# Patient Record
Sex: FEMALE | Race: WHITE | Employment: UNEMPLOYED | URBAN - NONMETROPOLITAN AREA
[De-identification: names, ages, dates, MRNs, and addresses within clinical notes are randomized per-mention and may not be internally consistent; named-entity substitution may affect disease eponyms.]

---

## 2018-01-30 ENCOUNTER — OFFICE VISIT (OUTPATIENT)
Dept: FAMILY MEDICINE CLINIC | Age: 58
End: 2018-01-30
Payer: COMMERCIAL

## 2018-01-30 VITALS
DIASTOLIC BLOOD PRESSURE: 84 MMHG | RESPIRATION RATE: 12 BRPM | BODY MASS INDEX: 48.82 KG/M2 | HEART RATE: 84 BPM | HEIGHT: 65 IN | SYSTOLIC BLOOD PRESSURE: 116 MMHG | WEIGHT: 293 LBS

## 2018-01-30 DIAGNOSIS — G90.01 CAROTIDYNIA: Primary | ICD-10-CM

## 2018-01-30 DIAGNOSIS — M31.30 WEGENER'S GRANULOMATOSIS: ICD-10-CM

## 2018-01-30 DIAGNOSIS — E66.01 MORBID OBESITY WITH BMI OF 45.0-49.9, ADULT (HCC): ICD-10-CM

## 2018-01-30 PROCEDURE — 99214 OFFICE O/P EST MOD 30 MIN: CPT | Performed by: FAMILY MEDICINE

## 2018-01-30 RX ORDER — ROSUVASTATIN CALCIUM 20 MG/1
20 TABLET, COATED ORAL DAILY
COMMUNITY
End: 2019-10-11

## 2018-01-30 RX ORDER — FOLIC ACID 1 MG/1
1 TABLET ORAL DAILY
COMMUNITY
Start: 2018-01-18 | End: 2018-07-05 | Stop reason: ALTCHOICE

## 2018-01-30 RX ORDER — PREDNISONE 1 MG/1
1 TABLET ORAL DAILY
COMMUNITY
Start: 2018-01-18 | End: 2019-10-05

## 2018-01-30 ASSESSMENT — PATIENT HEALTH QUESTIONNAIRE - PHQ9
SUM OF ALL RESPONSES TO PHQ9 QUESTIONS 1 & 2: 0
SUM OF ALL RESPONSES TO PHQ QUESTIONS 1-9: 0
2. FEELING DOWN, DEPRESSED OR HOPELESS: 0
1. LITTLE INTEREST OR PLEASURE IN DOING THINGS: 0

## 2018-01-30 ASSESSMENT — ENCOUNTER SYMPTOMS
SORE THROAT: 0
NAUSEA: 0
BLOOD IN STOOL: 0
VOMITING: 0
EYE PAIN: 0
DIARRHEA: 0
ABDOMINAL PAIN: 0
COUGH: 0
CHEST TIGHTNESS: 0
CONSTIPATION: 0
WHEEZING: 0
BACK PAIN: 0
SHORTNESS OF BREATH: 0
RHINORRHEA: 0

## 2018-01-30 NOTE — PATIENT INSTRUCTIONS
Patient Education        Carotidynia: Care Instructions  Your Care Instructions  Carotidynia is a pain that you feel in your neck or face. It is linked with physical changes that can happen in a carotid artery in your neck. Your neck may feel tender in the area of the artery. The pain often goes up the neck to the jaw, ear, or forehead. Carotidynia usually occurs in young or middle-aged adults. Some diseases can cause carotidynia. Your doctor will check for those. Simple carotidynia may be treated with pain medicine. Follow-up care is a key part of your treatment and safety. Be sure to make and go to all appointments, and call your doctor if you are having problems. It's also a good idea to know your test results and keep a list of the medicines you take. How can you care for yourself at home? · Use a cold pack where your neck hurts. This may help decrease pain and swelling. If the problem is near the shoulder or upper back, ice the back of the neck. Do this for 10 to 20 minutes at a time. Put a thin cloth between the ice and your skin. You may also use ice frozen in a foam cup. Be sure not to leave it on your skin too long, or you can get frostbite. · Try heat if ice does not help. To apply heat, put a warm water bottle, a heating pad set on low, or a warm cloth on your neck. Do not go to sleep with a heating pad on your skin. · Take anti-inflammatory medicine to reduce pain and swelling. These include ibuprofen (Advil, Motrin) and naproxen (Aleve). Read and follow all instructions on the label. When should you call for help? Call 911 anytime you think you may need emergency care. For example, call if:  ? · You have severe pain in your neck, ear, or head and have numbness on one side of your body. ?Call your doctor now or seek immediate medical care if:  ? · Your home treatment does not work. ? · Your pain gets worse or keeps you from doing what you normally do.    ? · You get numbness or weakness anywhere. ? · You have a fever. ? Watch closely for changes in your health, and be sure to contact your doctor if you have any problems. Where can you learn more? Go to https://Bubblypeblake.[employer]. org and sign in to your [employer] account. Enter O548 in the [facility] box to learn more about \"Carotidynia: Care Instructions. \"     If you do not have an account, please click on the \"Sign Up Now\" link. Current as of: March 20, 2017  Content Version: 11.5  © 0237-3919 [employer]. Care instructions adapted under license by [facility] ([facility]). If you have questions about a medical condition or this instruction, always ask your healthcare professional. [address] any warranty or liability for your use of this information. Patient Education        DASH Diet: Care Instructions  Your Care Instructions    The DASH diet is an eating plan that can help lower your blood pressure. DASH stands for Dietary Approaches to Stop Hypertension. Hypertension is high blood pressure. The DASH diet focuses on eating foods that are high in calcium, potassium, and magnesium. These nutrients can lower blood pressure. The foods that are highest in these nutrients are fruits, vegetables, low-fat dairy products, nuts, seeds, and legumes. But taking calcium, potassium, and magnesium supplements instead of eating foods that are high in those nutrients does not have the same effect. The DASH diet also includes whole grains, fish, and poultry. The DASH diet is one of several lifestyle changes your doctor may recommend to lower your high blood pressure. Your doctor may also want you to decrease the amount of sodium in your diet. Lowering sodium while following the DASH diet can lower blood pressure even further than just the DASH diet alone. Follow-up care is a key part of your treatment and safety.  Be sure to make and go to all appointments, and call your doctor if you are having

## 2018-01-30 NOTE — PROGRESS NOTES
Subjective:      Patient ID: Edson Maxwell is a 65 y.o. female. HPI  Pt here for a check up. REviewed BMI of 49. Encouraged diet, exercise and weight loss. Complains of right sided carotid pain last night. It is still there today but much improved. If on autoimmune meds for Wegeners granulomatosis with Dr. Chema Reno. , non smoker, pmh reviewed. No carotid imaging has been done. Review of Systems   Constitutional: Negative for chills, fatigue, fever and unexpected weight change. HENT: Negative for congestion, ear pain, rhinorrhea and sore throat. Eyes: Negative for pain and visual disturbance. Respiratory: Negative for cough, chest tightness, shortness of breath and wheezing. Cardiovascular: Negative for chest pain and palpitations. Gastrointestinal: Negative for abdominal pain, blood in stool, constipation, diarrhea, nausea and vomiting. Genitourinary: Negative for difficulty urinating, frequency, hematuria and urgency. Musculoskeletal: Positive for neck pain. Negative for back pain, joint swelling and myalgias. Skin: Negative for rash. Neurological: Negative for dizziness and headaches. Hematological: Negative for adenopathy. Does not bruise/bleed easily. Psychiatric/Behavioral: Negative for behavioral problems and sleep disturbance. The patient is not nervous/anxious. Objective:   Physical Exam   Constitutional: She is oriented to person, place, and time. She appears well-developed and well-nourished. HENT:   Head: Normocephalic and atraumatic. Right Ear: External ear normal.   Left Ear: External ear normal.   Nose: Nose normal.   Mouth/Throat: Oropharynx is clear and moist.   Eyes: EOM are normal. Pupils are equal, round, and reactive to light. Neck: Neck supple. Carotid bruit is not present. No thyromegaly present. Cardiovascular: Normal rate, regular rhythm and normal heart sounds. Pulmonary/Chest: Breath sounds normal. She has no wheezes.  She has no rales. Abdominal: Soft. Bowel sounds are normal. There is no tenderness. There is no rebound and no guarding. Musculoskeletal: Normal range of motion. She exhibits no edema. Lymphadenopathy:     She has no cervical adenopathy. Neurological: She is alert and oriented to person, place, and time. She has normal reflexes. No cranial nerve deficit. Skin: Skin is warm and dry. No rash noted. Psychiatric: She has a normal mood and affect. Nursing note and vitals reviewed. Assessment:      Carotidynia  Wegeners granulomatosis  Morbid obesity      Plan:      Carotid ultrasound--BRYANT German--prefers AMs  Continue the prednisone and methotrexate  Encouraged diet, exercise and weight loss. Dash diet    Julianne Ibarra received counseling on the following healthy behaviors: nutrition, exercise and medication adherence    Patient given educational materials on Nutrition and Exercise    I have instructed Julinane Ibarra to complete a self tracking handout on Weights and instructed them to bring it with them to her next appointment. Discussed use, benefit, and side effects of prescribed medications. Barriers to medication compliance addressed. All patient questions answered. Pt voiced understanding.

## 2018-02-02 ENCOUNTER — TELEPHONE (OUTPATIENT)
Dept: FAMILY MEDICINE CLINIC | Age: 58
End: 2018-02-02

## 2018-04-20 ENCOUNTER — OFFICE VISIT (OUTPATIENT)
Dept: FAMILY MEDICINE CLINIC | Age: 58
End: 2018-04-20
Payer: COMMERCIAL

## 2018-04-20 VITALS
HEART RATE: 48 BPM | SYSTOLIC BLOOD PRESSURE: 116 MMHG | WEIGHT: 291.4 LBS | DIASTOLIC BLOOD PRESSURE: 80 MMHG | RESPIRATION RATE: 12 BRPM | BODY MASS INDEX: 48.12 KG/M2

## 2018-04-20 DIAGNOSIS — R00.2 HEART PALPITATIONS: ICD-10-CM

## 2018-04-20 DIAGNOSIS — E66.01 MORBID OBESITY WITH BMI OF 45.0-49.9, ADULT (HCC): ICD-10-CM

## 2018-04-20 DIAGNOSIS — R00.0 TACHYCARDIA: Primary | ICD-10-CM

## 2018-04-20 DIAGNOSIS — M31.30 WEGENER'S GRANULOMATOSIS: ICD-10-CM

## 2018-04-20 PROCEDURE — 99214 OFFICE O/P EST MOD 30 MIN: CPT | Performed by: FAMILY MEDICINE

## 2018-04-20 ASSESSMENT — ENCOUNTER SYMPTOMS
WHEEZING: 0
BLOOD IN STOOL: 0
CHEST TIGHTNESS: 0
BACK PAIN: 0
DIARRHEA: 0
SHORTNESS OF BREATH: 1
NAUSEA: 0
SORE THROAT: 0
CONSTIPATION: 0
COUGH: 0
RHINORRHEA: 0
ABDOMINAL PAIN: 0
VOMITING: 0
EYE PAIN: 0

## 2018-07-05 ENCOUNTER — OFFICE VISIT (OUTPATIENT)
Dept: FAMILY MEDICINE CLINIC | Age: 58
End: 2018-07-05
Payer: COMMERCIAL

## 2018-07-05 VITALS
WEIGHT: 282.4 LBS | DIASTOLIC BLOOD PRESSURE: 82 MMHG | SYSTOLIC BLOOD PRESSURE: 110 MMHG | BODY MASS INDEX: 46.63 KG/M2 | RESPIRATION RATE: 16 BRPM | TEMPERATURE: 97.5 F | HEART RATE: 100 BPM

## 2018-07-05 DIAGNOSIS — B97.89 VIRAL STOMATITIS: Primary | ICD-10-CM

## 2018-07-05 DIAGNOSIS — K12.1 VIRAL STOMATITIS: Primary | ICD-10-CM

## 2018-07-05 PROCEDURE — 99213 OFFICE O/P EST LOW 20 MIN: CPT | Performed by: FAMILY MEDICINE

## 2018-07-05 RX ORDER — AMOXICILLIN 875 MG/1
TABLET, COATED ORAL
COMMUNITY
Start: 2018-07-01 | End: 2019-02-06 | Stop reason: ALTCHOICE

## 2018-07-05 RX ORDER — HYDROCHLOROTHIAZIDE 12.5 MG/1
TABLET ORAL
COMMUNITY
Start: 2018-05-24 | End: 2019-10-11

## 2018-07-05 ASSESSMENT — ENCOUNTER SYMPTOMS
DIARRHEA: 1
SORE THROAT: 1
ABDOMINAL PAIN: 1
COUGH: 0
SHORTNESS OF BREATH: 0

## 2018-07-05 NOTE — PROGRESS NOTES
Chief Complaint   Patient presents with    Mouth Lesions     otalgia, diarrhea, chills, sweats x2 weeks         Mandy Thompson is a 63 y. o.female      Pt complains of right earache, sores on right tongue and right sore throat over the last 2 weeks. Was seen in an urgent care and started on amoxil. Now has diarrhea and abdominal cramping. No fever. Some sweats when she has abdominal cramping. Patient of Dr. Theodora Oakes. Nonsmoker. Review of Systems   Constitutional: Positive for appetite change, chills and diaphoresis. HENT: Positive for ear pain, mouth sores and sore throat. Respiratory: Negative for cough and shortness of breath. Cardiovascular: Negative. Gastrointestinal: Positive for abdominal pain and diarrhea. Skin: Negative for rash. Neurological: Negative for dizziness and headaches. All other systems reviewed and are negative. OBJECTIVE     /82 (Site: Right Arm, Position: Sitting, Cuff Size: Large Adult)   Pulse 100   Temp 97.5 °F (36.4 °C) (Oral)   Resp 16   Wt 282 lb 6.4 oz (128.1 kg)   BMI 46.63 kg/m²     Physical Exam   Constitutional: She appears well-developed and well-nourished. HENT:   Right Ear: Tympanic membrane normal.   Left Ear: Tympanic membrane normal.   Mouth/Throat: Oropharynx is clear and moist.       Cardiovascular: Normal rate and regular rhythm. No murmur heard. Pulmonary/Chest: Breath sounds normal. She has no wheezes. Musculoskeletal: She exhibits no edema. Lymphadenopathy:     She has no cervical adenopathy. ASSESSMENT      1. Viral stomatitis        PLAN     Magic Mouthwash with equal parts viscous lidocaine, benadryl and maalox. 5ml swish and spit qid. Ok to stop antibiotic. Illness likely viral.    Ok for probiotic to help with gi symptoms.     Follow up if not better            Electronically signed by Henry Winter MD on 7/5/2018 at 11:05 AM

## 2019-02-06 ENCOUNTER — OFFICE VISIT (OUTPATIENT)
Dept: FAMILY MEDICINE CLINIC | Age: 59
End: 2019-02-06
Payer: COMMERCIAL

## 2019-02-06 VITALS
HEIGHT: 65 IN | SYSTOLIC BLOOD PRESSURE: 110 MMHG | WEIGHT: 293 LBS | RESPIRATION RATE: 12 BRPM | HEART RATE: 78 BPM | TEMPERATURE: 98.1 F | BODY MASS INDEX: 48.82 KG/M2 | DIASTOLIC BLOOD PRESSURE: 82 MMHG

## 2019-02-06 DIAGNOSIS — J01.40 ACUTE NON-RECURRENT PANSINUSITIS: Primary | ICD-10-CM

## 2019-02-06 PROCEDURE — 99213 OFFICE O/P EST LOW 20 MIN: CPT | Performed by: FAMILY MEDICINE

## 2019-02-06 RX ORDER — AMOXICILLIN AND CLAVULANATE POTASSIUM 875; 125 MG/1; MG/1
1 TABLET, FILM COATED ORAL 2 TIMES DAILY
Qty: 20 TABLET | Refills: 0 | Status: SHIPPED | OUTPATIENT
Start: 2019-02-06 | End: 2019-05-17 | Stop reason: SDUPTHER

## 2019-02-06 ASSESSMENT — PATIENT HEALTH QUESTIONNAIRE - PHQ9
2. FEELING DOWN, DEPRESSED OR HOPELESS: 0
SUM OF ALL RESPONSES TO PHQ QUESTIONS 1-9: 0
SUM OF ALL RESPONSES TO PHQ QUESTIONS 1-9: 0
SUM OF ALL RESPONSES TO PHQ9 QUESTIONS 1 & 2: 0
1. LITTLE INTEREST OR PLEASURE IN DOING THINGS: 0

## 2019-02-06 ASSESSMENT — ENCOUNTER SYMPTOMS
RHINORRHEA: 0
ABDOMINAL PAIN: 0
DIARRHEA: 0
SINUS PRESSURE: 1
BLOOD IN STOOL: 0
COUGH: 0
NAUSEA: 0
SHORTNESS OF BREATH: 1
BACK PAIN: 0
EYE PAIN: 0
SORE THROAT: 0
VOMITING: 0
WHEEZING: 0
SWOLLEN GLANDS: 1
CHEST TIGHTNESS: 0
CONSTIPATION: 0
HOARSE VOICE: 1

## 2019-05-17 ENCOUNTER — TELEPHONE (OUTPATIENT)
Dept: FAMILY MEDICINE CLINIC | Age: 59
End: 2019-05-17

## 2019-05-17 DIAGNOSIS — J01.40 ACUTE NON-RECURRENT PANSINUSITIS: ICD-10-CM

## 2019-05-17 RX ORDER — AMOXICILLIN AND CLAVULANATE POTASSIUM 875; 125 MG/1; MG/1
1 TABLET, FILM COATED ORAL 2 TIMES DAILY
Qty: 20 TABLET | Refills: 0 | Status: SHIPPED | OUTPATIENT
Start: 2019-05-17 | End: 2019-05-27

## 2019-08-19 ENCOUNTER — OFFICE VISIT (OUTPATIENT)
Dept: FAMILY MEDICINE CLINIC | Age: 59
End: 2019-08-19
Payer: COMMERCIAL

## 2019-08-19 VITALS
DIASTOLIC BLOOD PRESSURE: 60 MMHG | HEART RATE: 64 BPM | WEIGHT: 291 LBS | TEMPERATURE: 97.6 F | BODY MASS INDEX: 48.05 KG/M2 | RESPIRATION RATE: 14 BRPM | SYSTOLIC BLOOD PRESSURE: 118 MMHG

## 2019-08-19 DIAGNOSIS — J01.01 RECURRENT MAXILLARY SINUSITIS: Primary | ICD-10-CM

## 2019-08-19 PROCEDURE — 99213 OFFICE O/P EST LOW 20 MIN: CPT | Performed by: NURSE PRACTITIONER

## 2019-08-19 RX ORDER — AMOXICILLIN AND CLAVULANATE POTASSIUM 875; 125 MG/1; MG/1
1 TABLET, FILM COATED ORAL 2 TIMES DAILY
Qty: 20 TABLET | Refills: 0 | Status: SHIPPED | OUTPATIENT
Start: 2019-08-19 | End: 2019-08-26 | Stop reason: SDUPTHER

## 2019-08-19 RX ORDER — CEPHALEXIN 500 MG/1
CAPSULE ORAL
Refills: 0 | COMMUNITY
Start: 2019-08-11 | End: 2019-08-26

## 2019-08-19 ASSESSMENT — ENCOUNTER SYMPTOMS
COUGH: 0
EYE ITCHING: 0
EYE REDNESS: 1
CONSTIPATION: 0
RHINORRHEA: 1
SINUS PRESSURE: 1
EYE DISCHARGE: 0
COLOR CHANGE: 0
ABDOMINAL PAIN: 0
VOICE CHANGE: 1
BACK PAIN: 0
SORE THROAT: 0
WHEEZING: 0
DIARRHEA: 0
EYE PAIN: 0
SHORTNESS OF BREATH: 0

## 2019-08-26 ENCOUNTER — OFFICE VISIT (OUTPATIENT)
Dept: FAMILY MEDICINE CLINIC | Age: 59
End: 2019-08-26
Payer: COMMERCIAL

## 2019-08-26 VITALS
DIASTOLIC BLOOD PRESSURE: 82 MMHG | HEART RATE: 72 BPM | RESPIRATION RATE: 14 BRPM | SYSTOLIC BLOOD PRESSURE: 138 MMHG | TEMPERATURE: 98.1 F | BODY MASS INDEX: 49.28 KG/M2 | WEIGHT: 293 LBS

## 2019-08-26 DIAGNOSIS — J01.01 RECURRENT MAXILLARY SINUSITIS: ICD-10-CM

## 2019-08-26 DIAGNOSIS — J32.9 RECURRENT SINUSITIS: Primary | ICD-10-CM

## 2019-08-26 PROCEDURE — 99213 OFFICE O/P EST LOW 20 MIN: CPT | Performed by: FAMILY MEDICINE

## 2019-08-26 RX ORDER — AMOXICILLIN AND CLAVULANATE POTASSIUM 875; 125 MG/1; MG/1
1 TABLET, FILM COATED ORAL 2 TIMES DAILY
Qty: 20 TABLET | Refills: 0 | Status: SHIPPED | OUTPATIENT
Start: 2019-08-26 | End: 2020-03-25 | Stop reason: SDUPTHER

## 2019-08-26 ASSESSMENT — ENCOUNTER SYMPTOMS
SHORTNESS OF BREATH: 0
NAUSEA: 0
SORE THROAT: 0
BLOOD IN STOOL: 0
ABDOMINAL PAIN: 0
COUGH: 0
RHINORRHEA: 1
DIARRHEA: 0
CONSTIPATION: 0
WHEEZING: 0
CHEST TIGHTNESS: 0
SINUS PAIN: 1
VOMITING: 0
BACK PAIN: 0
EYE PAIN: 0
SINUS PRESSURE: 1

## 2019-08-26 NOTE — PATIENT INSTRUCTIONS

## 2019-09-03 ENCOUNTER — OFFICE VISIT (OUTPATIENT)
Dept: ENT CLINIC | Age: 59
End: 2019-09-03
Payer: COMMERCIAL

## 2019-09-03 VITALS
DIASTOLIC BLOOD PRESSURE: 72 MMHG | HEIGHT: 65 IN | BODY MASS INDEX: 48.82 KG/M2 | SYSTOLIC BLOOD PRESSURE: 126 MMHG | WEIGHT: 293 LBS | HEART RATE: 84 BPM | TEMPERATURE: 97.7 F | RESPIRATION RATE: 16 BRPM

## 2019-09-03 DIAGNOSIS — J32.2 CHRONIC ETHMOIDAL SINUSITIS: ICD-10-CM

## 2019-09-03 DIAGNOSIS — J32.0 CHRONIC MAXILLARY SINUSITIS: Primary | ICD-10-CM

## 2019-09-03 PROCEDURE — 99203 OFFICE O/P NEW LOW 30 MIN: CPT | Performed by: PHYSICIAN ASSISTANT

## 2019-09-03 RX ORDER — FLUTICASONE PROPIONATE 50 MCG
2 SPRAY, SUSPENSION (ML) NASAL DAILY
Qty: 1 BOTTLE | Refills: 3 | Status: SHIPPED | OUTPATIENT
Start: 2019-09-03 | End: 2020-03-04

## 2019-09-03 RX ORDER — AMOXICILLIN AND CLAVULANATE POTASSIUM 875; 125 MG/1; MG/1
1 TABLET, FILM COATED ORAL 2 TIMES DAILY
Qty: 42 TABLET | Refills: 0 | Status: SHIPPED | OUTPATIENT
Start: 2019-09-03 | End: 2019-09-24

## 2019-09-03 ASSESSMENT — ENCOUNTER SYMPTOMS
TROUBLE SWALLOWING: 0
VOMITING: 0
COLOR CHANGE: 0
VOICE CHANGE: 0
STRIDOR: 0
ABDOMINAL PAIN: 0
CHOKING: 0
SORE THROAT: 0
NAUSEA: 0
FACIAL SWELLING: 1
SHORTNESS OF BREATH: 0
SINUS PRESSURE: 1
CHEST TIGHTNESS: 0
APNEA: 0
DIARRHEA: 0
COUGH: 0
RHINORRHEA: 1
WHEEZING: 0

## 2019-09-05 LAB — THROAT/NOSE CULTURE: NORMAL

## 2019-09-17 ENCOUNTER — TELEPHONE (OUTPATIENT)
Dept: ENT CLINIC | Age: 59
End: 2019-09-17

## 2019-09-18 RX ORDER — DOXYCYCLINE HYCLATE 100 MG
100 TABLET ORAL 2 TIMES DAILY
Qty: 20 TABLET | Refills: 0 | Status: SHIPPED | OUTPATIENT
Start: 2019-09-18 | End: 2019-09-28

## 2019-09-18 NOTE — TELEPHONE ENCOUNTER
Spoke to pt today and she is feeling worse today, she is tired of feeling so fatigued that she is unable to work & drive

## 2019-09-30 ENCOUNTER — TELEPHONE (OUTPATIENT)
Dept: ENT CLINIC | Age: 59
End: 2019-09-30

## 2019-10-03 ENCOUNTER — OFFICE VISIT (OUTPATIENT)
Dept: FAMILY MEDICINE CLINIC | Age: 59
End: 2019-10-03
Payer: COMMERCIAL

## 2019-10-03 VITALS
SYSTOLIC BLOOD PRESSURE: 112 MMHG | BODY MASS INDEX: 48.96 KG/M2 | RESPIRATION RATE: 14 BRPM | TEMPERATURE: 98.1 F | WEIGHT: 293 LBS | HEART RATE: 72 BPM | DIASTOLIC BLOOD PRESSURE: 68 MMHG

## 2019-10-03 DIAGNOSIS — J32.9 RECURRENT SINUSITIS: Primary | ICD-10-CM

## 2019-10-03 PROCEDURE — 99213 OFFICE O/P EST LOW 20 MIN: CPT | Performed by: FAMILY MEDICINE

## 2019-10-03 RX ORDER — PREDNISONE 20 MG/1
TABLET ORAL
Qty: 15 TABLET | Refills: 0 | Status: SHIPPED | OUTPATIENT
Start: 2019-10-03 | End: 2019-10-09 | Stop reason: SINTOL

## 2019-10-03 RX ORDER — CLINDAMYCIN HYDROCHLORIDE 300 MG/1
300 CAPSULE ORAL 4 TIMES DAILY
Qty: 40 CAPSULE | Refills: 0 | Status: SHIPPED | OUTPATIENT
Start: 2019-10-03 | End: 2019-10-11

## 2019-10-03 ASSESSMENT — ENCOUNTER SYMPTOMS
DIARRHEA: 0
HOARSE VOICE: 0
EYE PAIN: 0
SINUS PAIN: 1
SORE THROAT: 1
BACK PAIN: 0
BLOOD IN STOOL: 0
VOMITING: 0
SHORTNESS OF BREATH: 0
ABDOMINAL PAIN: 0
COUGH: 1
SINUS PRESSURE: 1
CHEST TIGHTNESS: 0
SWOLLEN GLANDS: 1
CONSTIPATION: 0
NAUSEA: 0
RHINORRHEA: 0
WHEEZING: 0

## 2019-10-04 ENCOUNTER — HOSPITAL ENCOUNTER (OUTPATIENT)
Dept: CT IMAGING | Age: 59
Discharge: HOME | End: 2019-10-04
Payer: COMMERCIAL

## 2019-10-04 DIAGNOSIS — J32.2 CHRONIC ETHMOIDAL SINUSITIS: ICD-10-CM

## 2019-10-04 DIAGNOSIS — J32.0 CHRONIC MAXILLARY SINUSITIS: ICD-10-CM

## 2019-10-04 PROCEDURE — 70486 CT MAXILLOFACIAL W/O DYE: CPT

## 2019-10-05 ENCOUNTER — HOSPITAL ENCOUNTER (EMERGENCY)
Age: 59
Discharge: HOME | End: 2019-10-05
Attending: EMERGENCY MEDICINE
Payer: COMMERCIAL

## 2019-10-05 VITALS
RESPIRATION RATE: 18 BRPM | OXYGEN SATURATION: 95 % | DIASTOLIC BLOOD PRESSURE: 99 MMHG | TEMPERATURE: 97.6 F | HEIGHT: 66 IN | WEIGHT: 290 LBS | BODY MASS INDEX: 46.61 KG/M2 | HEART RATE: 96 BPM | SYSTOLIC BLOOD PRESSURE: 164 MMHG

## 2019-10-05 DIAGNOSIS — M31.30: Primary | ICD-10-CM

## 2019-10-05 DIAGNOSIS — R03.0 ELEVATED BLOOD PRESSURE READING: ICD-10-CM

## 2019-10-05 PROCEDURE — 99283 EMERGENCY DEPT VISIT LOW MDM: CPT

## 2019-10-05 ASSESSMENT — ENCOUNTER SYMPTOMS
ABDOMINAL DISTENTION: 0
SHORTNESS OF BREATH: 0
VOMITING: 0
ABDOMINAL PAIN: 0
DIARRHEA: 0
RHINORRHEA: 1
NAUSEA: 0
SINUS PAIN: 1
COUGH: 0
EYE ITCHING: 0
EYE DISCHARGE: 0
WHEEZING: 0

## 2019-10-05 ASSESSMENT — PAIN DESCRIPTION - PAIN TYPE: TYPE: ACUTE PAIN

## 2019-10-05 ASSESSMENT — PAIN DESCRIPTION - LOCATION: LOCATION: NOSE

## 2019-10-05 ASSESSMENT — PAIN SCALES - GENERAL: PAINLEVEL_OUTOF10: 4

## 2019-10-05 ASSESSMENT — PAIN DESCRIPTION - DESCRIPTORS: DESCRIPTORS: CONSTANT

## 2019-10-07 ENCOUNTER — TELEPHONE (OUTPATIENT)
Dept: FAMILY MEDICINE CLINIC | Age: 59
End: 2019-10-07

## 2019-10-07 ENCOUNTER — OFFICE VISIT (OUTPATIENT)
Dept: FAMILY MEDICINE CLINIC | Age: 59
End: 2019-10-07
Payer: COMMERCIAL

## 2019-10-07 VITALS
RESPIRATION RATE: 12 BRPM | DIASTOLIC BLOOD PRESSURE: 84 MMHG | SYSTOLIC BLOOD PRESSURE: 120 MMHG | BODY MASS INDEX: 46.58 KG/M2 | HEART RATE: 84 BPM | WEIGHT: 288.6 LBS

## 2019-10-07 DIAGNOSIS — J32.4 CHRONIC PANSINUSITIS: Primary | ICD-10-CM

## 2019-10-07 DIAGNOSIS — M31.30 GRANULOMATOSIS WITH POLYANGIITIS, UNSPECIFIED WHETHER RENAL INVOLVEMENT (HCC): ICD-10-CM

## 2019-10-07 PROCEDURE — 99213 OFFICE O/P EST LOW 20 MIN: CPT | Performed by: FAMILY MEDICINE

## 2019-10-07 ASSESSMENT — ENCOUNTER SYMPTOMS
SORE THROAT: 0
BACK PAIN: 0
NAUSEA: 0
RHINORRHEA: 0
COUGH: 0
WHEEZING: 0
VOMITING: 0
CONSTIPATION: 0
SINUS PRESSURE: 1
DIARRHEA: 0
SINUS PAIN: 1
BLOOD IN STOOL: 0
CHEST TIGHTNESS: 0
SHORTNESS OF BREATH: 0
EYE PAIN: 0
ABDOMINAL PAIN: 0

## 2019-10-09 ENCOUNTER — TELEPHONE (OUTPATIENT)
Dept: FAMILY MEDICINE CLINIC | Age: 59
End: 2019-10-09

## 2019-10-09 ENCOUNTER — NURSE ONLY (OUTPATIENT)
Dept: LAB | Age: 59
End: 2019-10-09

## 2019-10-09 LAB
ALBUMIN SERPL-MCNC: 3.6 G/DL (ref 3.5–5.1)
ALP BLD-CCNC: 96 U/L (ref 38–126)
ALT SERPL-CCNC: 33 U/L (ref 11–66)
ANION GAP SERPL CALCULATED.3IONS-SCNC: 12 MEQ/L (ref 8–16)
AST SERPL-CCNC: 18 U/L (ref 5–40)
BASOPHILS # BLD: 0.2 %
BASOPHILS ABSOLUTE: 0 THOU/MM3 (ref 0–0.1)
BILIRUB SERPL-MCNC: 0.3 MG/DL (ref 0.3–1.2)
BUN BLDV-MCNC: 14 MG/DL (ref 7–22)
CALCIUM SERPL-MCNC: 10 MG/DL (ref 8.5–10.5)
CHLORIDE BLD-SCNC: 100 MEQ/L (ref 98–111)
CO2: 27 MEQ/L (ref 23–33)
CREAT SERPL-MCNC: 0.6 MG/DL (ref 0.4–1.2)
EOSINOPHIL # BLD: 0.2 %
EOSINOPHILS ABSOLUTE: 0 THOU/MM3 (ref 0–0.4)
ERYTHROCYTE [DISTWIDTH] IN BLOOD BY AUTOMATED COUNT: 17.7 % (ref 11.5–14.5)
ERYTHROCYTE [DISTWIDTH] IN BLOOD BY AUTOMATED COUNT: 49.6 FL (ref 35–45)
GFR SERPL CREATININE-BSD FRML MDRD: > 90 ML/MIN/1.73M2
HBV SURFACE AB TITR SER: NEGATIVE {TITER}
HCT VFR BLD CALC: 45.1 % (ref 37–47)
HEMOGLOBIN: 13.3 GM/DL (ref 12–16)
HEPATITIS B SURFACE ANTIGEN: NEGATIVE
HEPATITIS C ANTIBODY: NEGATIVE
IMMATURE GRANS (ABS): 0.15 THOU/MM3 (ref 0–0.07)
IMMATURE GRANULOCYTES: 0.9 %
LYMPHOCYTES # BLD: 11.8 %
LYMPHOCYTES ABSOLUTE: 2 THOU/MM3 (ref 1–4.8)
MCH RBC QN AUTO: 24.1 PG (ref 26–33)
MCHC RBC AUTO-ENTMCNC: 29.5 GM/DL (ref 32.2–35.5)
MCV RBC AUTO: 81.9 FL (ref 81–99)
MONOCYTES # BLD: 2.5 %
MONOCYTES ABSOLUTE: 0.4 THOU/MM3 (ref 0.4–1.3)
NUCLEATED RED BLOOD CELLS: 0 /100 WBC
PLATELET # BLD: 404 THOU/MM3 (ref 130–400)
PLATELET ESTIMATE: ADEQUATE
PMV BLD AUTO: 10.1 FL (ref 9.4–12.4)
POIKILOCYTES: ABNORMAL
POTASSIUM SERPL-SCNC: 4.9 MEQ/L (ref 3.5–5.2)
RBC # BLD: 5.51 MILL/MM3 (ref 4.2–5.4)
SCAN OF BLOOD SMEAR: NORMAL
SEDIMENTATION RATE, ERYTHROCYTE: 26 MM/HR (ref 0–20)
SEG NEUTROPHILS: 84.4 %
SEGMENTED NEUTROPHILS ABSOLUTE COUNT: 14.2 THOU/MM3 (ref 1.8–7.7)
SODIUM BLD-SCNC: 139 MEQ/L (ref 135–145)
TOTAL PROTEIN: 7.5 G/DL (ref 6.1–8)
WBC # BLD: 16.8 THOU/MM3 (ref 4.8–10.8)

## 2019-10-11 ENCOUNTER — OFFICE VISIT (OUTPATIENT)
Dept: ENT CLINIC | Age: 59
End: 2019-10-11
Payer: COMMERCIAL

## 2019-10-11 VITALS
RESPIRATION RATE: 16 BRPM | HEART RATE: 76 BPM | WEIGHT: 286 LBS | SYSTOLIC BLOOD PRESSURE: 128 MMHG | TEMPERATURE: 97.5 F | HEIGHT: 64 IN | BODY MASS INDEX: 48.83 KG/M2 | DIASTOLIC BLOOD PRESSURE: 84 MMHG

## 2019-10-11 DIAGNOSIS — J32.0 CHRONIC MAXILLARY SINUSITIS: ICD-10-CM

## 2019-10-11 DIAGNOSIS — M31.30: Primary | ICD-10-CM

## 2019-10-11 DIAGNOSIS — J32.2 CHRONIC ETHMOIDAL SINUSITIS: ICD-10-CM

## 2019-10-11 DIAGNOSIS — J32.3 CHRONIC SPHENOIDAL SINUSITIS: ICD-10-CM

## 2019-10-11 DIAGNOSIS — J32.1 CHRONIC FRONTAL SINUSITIS: ICD-10-CM

## 2019-10-11 PROCEDURE — 99213 OFFICE O/P EST LOW 20 MIN: CPT | Performed by: OTOLARYNGOLOGY

## 2019-10-11 ASSESSMENT — ENCOUNTER SYMPTOMS
SINUS PRESSURE: 1
STRIDOR: 0
VOICE CHANGE: 0
SHORTNESS OF BREATH: 0
SORE THROAT: 0
WHEEZING: 0
COUGH: 0
APNEA: 1
TROUBLE SWALLOWING: 0
DIARRHEA: 0
FACIAL SWELLING: 1
ABDOMINAL PAIN: 0
COLOR CHANGE: 0
NAUSEA: 0
CHOKING: 0
VOMITING: 0
RHINORRHEA: 1
CHEST TIGHTNESS: 0

## 2019-10-12 LAB — HEPATITIS B CORE TOTAL ANTIBODY: NEGATIVE

## 2019-10-14 LAB — NEUTROPHIL CYTOPLASMIC AB IGG: ABNORMAL

## 2019-10-17 LAB
MYELOPEROXIDASE AB, IGG: 31 AU/ML (ref 0–19)
SERINE PROTEASE 3, IGG: 287

## 2019-10-30 ENCOUNTER — NURSE ONLY (OUTPATIENT)
Dept: LAB | Age: 59
End: 2019-10-30

## 2019-10-30 LAB
ALBUMIN SERPL-MCNC: 3.8 G/DL (ref 3.5–5.1)
ALT SERPL-CCNC: 18 U/L (ref 11–66)
BASOPHILS # BLD: 0.3 %
BASOPHILS ABSOLUTE: 0 THOU/MM3 (ref 0–0.1)
CREAT SERPL-MCNC: 0.7 MG/DL (ref 0.4–1.2)
EOSINOPHIL # BLD: 0.8 %
EOSINOPHILS ABSOLUTE: 0.1 THOU/MM3 (ref 0–0.4)
ERYTHROCYTE [DISTWIDTH] IN BLOOD BY AUTOMATED COUNT: 19.5 % (ref 11.5–14.5)
ERYTHROCYTE [DISTWIDTH] IN BLOOD BY AUTOMATED COUNT: 55 FL (ref 35–45)
GFR SERPL CREATININE-BSD FRML MDRD: 86 ML/MIN/1.73M2
HCT VFR BLD CALC: 40.7 % (ref 37–47)
HEMOGLOBIN: 12.1 GM/DL (ref 12–16)
IMMATURE GRANS (ABS): 0.1 THOU/MM3 (ref 0–0.07)
IMMATURE GRANULOCYTES: 0.8 %
LYMPHOCYTES # BLD: 26 %
LYMPHOCYTES ABSOLUTE: 3.2 THOU/MM3 (ref 1–4.8)
MCH RBC QN AUTO: 24.4 PG (ref 26–33)
MCHC RBC AUTO-ENTMCNC: 29.7 GM/DL (ref 32.2–35.5)
MCV RBC AUTO: 82.1 FL (ref 81–99)
MONOCYTES # BLD: 6.3 %
MONOCYTES ABSOLUTE: 0.8 THOU/MM3 (ref 0.4–1.3)
NUCLEATED RED BLOOD CELLS: 0 /100 WBC
PLATELET # BLD: 361 THOU/MM3 (ref 130–400)
PMV BLD AUTO: 9.1 FL (ref 9.4–12.4)
RBC # BLD: 4.96 MILL/MM3 (ref 4.2–5.4)
SEDIMENTATION RATE, ERYTHROCYTE: 52 MM/HR (ref 0–20)
SEG NEUTROPHILS: 65.8 %
SEGMENTED NEUTROPHILS ABSOLUTE COUNT: 8.1 THOU/MM3 (ref 1.8–7.7)
WBC # BLD: 12.3 THOU/MM3 (ref 4.8–10.8)

## 2020-03-04 ENCOUNTER — OFFICE VISIT (OUTPATIENT)
Dept: FAMILY MEDICINE CLINIC | Age: 60
End: 2020-03-04
Payer: COMMERCIAL

## 2020-03-04 VITALS
DIASTOLIC BLOOD PRESSURE: 70 MMHG | WEIGHT: 293 LBS | SYSTOLIC BLOOD PRESSURE: 110 MMHG | RESPIRATION RATE: 16 BRPM | HEART RATE: 80 BPM | TEMPERATURE: 97.5 F | BODY MASS INDEX: 50.64 KG/M2

## 2020-03-04 PROBLEM — H90.5 SENSORINEURAL HEARING LOSS, UNILATERAL WITH UNRESTRICTED HEARING ON THE CONTRALATERAL SIDE: Status: ACTIVE | Noted: 2020-03-04

## 2020-03-04 PROBLEM — I47.29 NONSUSTAINED VENTRICULAR TACHYCARDIA: Status: ACTIVE | Noted: 2020-03-04

## 2020-03-04 PROBLEM — J32.2 CHRONIC ETHMOIDAL SINUSITIS: Status: ACTIVE | Noted: 2020-03-04

## 2020-03-04 PROBLEM — K21.9 GASTROESOPHAGEAL REFLUX DISEASE: Status: ACTIVE | Noted: 2020-03-04

## 2020-03-04 PROBLEM — H90.3 ASYMMETRICAL SENSORINEURAL HEARING LOSS: Status: ACTIVE | Noted: 2020-03-04

## 2020-03-04 PROCEDURE — 99213 OFFICE O/P EST LOW 20 MIN: CPT | Performed by: FAMILY MEDICINE

## 2020-03-04 RX ORDER — SULFAMETHOXAZOLE AND TRIMETHOPRIM 400; 80 MG/1; MG/1
1 TABLET ORAL
COMMUNITY
Start: 2019-11-25 | End: 2020-04-10 | Stop reason: DRUGHIGH

## 2020-03-04 RX ORDER — CIPROFLOXACIN 500 MG/1
500 TABLET, FILM COATED ORAL 2 TIMES DAILY
Qty: 20 TABLET | Refills: 0 | Status: SHIPPED | OUTPATIENT
Start: 2020-03-04 | End: 2020-03-13 | Stop reason: ALTCHOICE

## 2020-03-04 RX ORDER — FOLIC ACID 1 MG/1
TABLET ORAL
COMMUNITY
End: 2020-04-10 | Stop reason: ALTCHOICE

## 2020-03-04 RX ORDER — EZETIMIBE 10 MG/1
10 TABLET ORAL
COMMUNITY

## 2020-03-04 RX ORDER — ROSUVASTATIN CALCIUM 20 MG/1
20 TABLET, COATED ORAL
COMMUNITY

## 2020-03-04 RX ORDER — PREDNISONE 10 MG/1
60 TABLET ORAL DAILY
COMMUNITY
Start: 2020-01-21

## 2020-03-04 ASSESSMENT — ENCOUNTER SYMPTOMS
NAUSEA: 0
BACK PAIN: 0
SORE THROAT: 0
VOMITING: 0
DIARRHEA: 0
CHEST TIGHTNESS: 0
ABDOMINAL PAIN: 0
BLOOD IN STOOL: 0
SHORTNESS OF BREATH: 0
COUGH: 0
EYE PAIN: 0
RHINORRHEA: 0
WHEEZING: 0
CONSTIPATION: 0

## 2020-03-04 ASSESSMENT — PATIENT HEALTH QUESTIONNAIRE - PHQ9
SUM OF ALL RESPONSES TO PHQ QUESTIONS 1-9: 0
SUM OF ALL RESPONSES TO PHQ9 QUESTIONS 1 & 2: 0
2. FEELING DOWN, DEPRESSED OR HOPELESS: 0
1. LITTLE INTEREST OR PLEASURE IN DOING THINGS: 0
SUM OF ALL RESPONSES TO PHQ QUESTIONS 1-9: 0

## 2020-03-04 NOTE — PROGRESS NOTES
Subjective:      Patient ID: Dex Aaron is a 63 y.o. female. Otalgia    There is pain in the left ear. The current episode started 1 to 4 weeks ago (x 1 month). The problem has been gradually worsening. There has been no fever. Associated symptoms include hearing loss. Pertinent negatives include no abdominal pain, coughing, diarrhea, ear discharge, headaches, neck pain, rash, rhinorrhea, sore throat or vomiting. She has tried ear drops for the symptoms. The treatment provided no relief. Review of Systems   Constitutional: Negative for chills, fatigue, fever and unexpected weight change. HENT: Positive for ear pain and hearing loss. Negative for congestion, ear discharge, rhinorrhea and sore throat. Eyes: Negative for pain and visual disturbance. Respiratory: Negative for cough, chest tightness, shortness of breath and wheezing. Cardiovascular: Negative for chest pain and palpitations. Gastrointestinal: Negative for abdominal pain, blood in stool, constipation, diarrhea, nausea and vomiting. Genitourinary: Negative for difficulty urinating, frequency, hematuria and urgency. Musculoskeletal: Negative for back pain, joint swelling, myalgias and neck pain. Skin: Negative for rash. Neurological: Negative for dizziness and headaches. Hematological: Negative for adenopathy. Does not bruise/bleed easily. Psychiatric/Behavioral: Negative for behavioral problems and sleep disturbance. The patient is not nervous/anxious. Objective:   Physical Exam  Vitals signs and nursing note reviewed. Constitutional:       Appearance: She is well-developed. HENT:      Head: Normocephalic and atraumatic. Right Ear: External ear normal.      Left Ear: External ear normal. Decreased hearing noted. A middle ear effusion is present.       Ears:      Comments: Left eardrum is yellow     Nose: Nose normal.      Mouth/Throat:      Mouth: Mucous membranes are moist.   Eyes:      Pupils: Pupils are equal, round, and reactive to light. Neck:      Musculoskeletal: Neck supple. Thyroid: No thyromegaly. Cardiovascular:      Rate and Rhythm: Normal rate and regular rhythm. Heart sounds: Normal heart sounds. Pulmonary:      Breath sounds: Normal breath sounds. No wheezing or rales. Abdominal:      General: Bowel sounds are normal.      Palpations: Abdomen is soft. Tenderness: There is no abdominal tenderness. There is no guarding or rebound. Musculoskeletal: Normal range of motion. Lymphadenopathy:      Cervical: No cervical adenopathy. Skin:     General: Skin is warm and dry. Findings: No rash. Neurological:      Mental Status: She is alert and oriented to person, place, and time. Cranial Nerves: No cranial nerve deficit. Deep Tendon Reflexes: Reflexes are normal and symmetric.          Assessment:      Left serous otitis      Plan:      Cipro 500 mg BID x 10 days   Hold bactrim ss while on the cipro        Purvi Bhandari MD

## 2020-03-04 NOTE — PATIENT INSTRUCTIONS
Patient Education        Middle Ear Fluid: Care Instructions  Your Care Instructions    Fluid often builds up inside the ear during a cold or allergies. Usually the fluid drains away, but sometimes a small tube in the ear, called the eustachian tube, stays blocked for months. Symptoms of fluid buildup may include:  · Popping, ringing, or a feeling of fullness or pressure in the ear. · Trouble hearing. · Balance problems and dizziness. In most cases, you can treat yourself at home. Follow-up care is a key part of your treatment and safety. Be sure to make and go to all appointments, and call your doctor if you are having problems. It's also a good idea to know your test results and keep a list of the medicines you take. How can you care for yourself at home? · In most cases, the fluid clears up within a few months without treatment. You may need more tests if the fluid does not clear up after 3 months. · If your doctor prescribed antibiotics, take them as directed. Do not stop taking them just because you feel better. You need to take the full course of antibiotics. When should you call for help? Call your doctor now or seek immediate medical care if:    · You have symptoms of infection, such as:  ? Increased pain, swelling, warmth, or redness. ? Pus draining from the area. ? A fever.    Watch closely for changes in your health, and be sure to contact your doctor if:    · You notice changes in hearing.     · You do not get better as expected. Where can you learn more? Go to [email or web address]://[email or web address]. org and sign in to your [place] account. Enter D528 in the [facility] box to learn more about \"Middle Ear Fluid: Care Instructions. \"     If you do not have an account, please click on the \"Sign Up Now\" link. Current as of: July 28, 2019  Content Version: 12.3  © 4469-0581 [employer]. Care instructions adapted under license by [facility] ([facility]).  If you have questions

## 2020-03-09 ENCOUNTER — NURSE ONLY (OUTPATIENT)
Dept: FAMILY MEDICINE CLINIC | Age: 60
End: 2020-03-09
Payer: COMMERCIAL

## 2020-03-09 LAB
ALBUMIN SERPL-MCNC: 3.9 G/DL (ref 3.5–5.1)
ALT SERPL-CCNC: 44 U/L (ref 11–66)
BASOPHILS # BLD: 0.3 %
BASOPHILS ABSOLUTE: 0 THOU/MM3 (ref 0–0.1)
CREAT SERPL-MCNC: 0.7 MG/DL (ref 0.4–1.2)
EOSINOPHIL # BLD: 0.4 %
EOSINOPHILS ABSOLUTE: 0 THOU/MM3 (ref 0–0.4)
ERYTHROCYTE [DISTWIDTH] IN BLOOD BY AUTOMATED COUNT: 14.9 % (ref 11.5–14.5)
ERYTHROCYTE [DISTWIDTH] IN BLOOD BY AUTOMATED COUNT: 47.9 FL (ref 35–45)
GFR SERPL CREATININE-BSD FRML MDRD: 86 ML/MIN/1.73M2
HCT VFR BLD CALC: 44.6 % (ref 37–47)
HEMOGLOBIN: 13.6 GM/DL (ref 12–16)
IMMATURE GRANS (ABS): 0.06 THOU/MM3 (ref 0–0.07)
IMMATURE GRANULOCYTES: 0.5 %
LYMPHOCYTES # BLD: 11.5 %
LYMPHOCYTES ABSOLUTE: 1.3 THOU/MM3 (ref 1–4.8)
MCH RBC QN AUTO: 27.1 PG (ref 26–33)
MCHC RBC AUTO-ENTMCNC: 30.5 GM/DL (ref 32.2–35.5)
MCV RBC AUTO: 88.8 FL (ref 81–99)
MONOCYTES # BLD: 2.7 %
MONOCYTES ABSOLUTE: 0.3 THOU/MM3 (ref 0.4–1.3)
NUCLEATED RED BLOOD CELLS: 0 /100 WBC
PLATELET # BLD: 258 THOU/MM3 (ref 130–400)
PMV BLD AUTO: 10.6 FL (ref 9.4–12.4)
RBC # BLD: 5.02 MILL/MM3 (ref 4.2–5.4)
SEDIMENTATION RATE, ERYTHROCYTE: 17 MM/HR (ref 0–20)
SEG NEUTROPHILS: 84.6 %
SEGMENTED NEUTROPHILS ABSOLUTE COUNT: 9.4 THOU/MM3 (ref 1.8–7.7)
WBC # BLD: 11.1 THOU/MM3 (ref 4.8–10.8)

## 2020-03-09 PROCEDURE — 36415 COLL VENOUS BLD VENIPUNCTURE: CPT | Performed by: FAMILY MEDICINE

## 2020-03-09 NOTE — PROGRESS NOTES
Blood work drawn today in the office, venous puncture, left arm, pt tolerated well.   Cbc, alt, creatinine, albumin and esr drawn per orders of Dr. Goldie Palafox

## 2020-03-13 ENCOUNTER — TELEPHONE (OUTPATIENT)
Dept: FAMILY MEDICINE CLINIC | Age: 60
End: 2020-03-13

## 2020-03-13 NOTE — TELEPHONE ENCOUNTER
Pt calling stating she was in last week to see [facility] for ear fullness and her ear is still feeling \"blocked off. \"  She is using otc ear gtts without relief. She wonders if she should be put on another antibiotic or start gtts? Uses [facility] in [place]. She has finished the cipro 500mg - 1 po bid. She wants to try to avoid coming to the office with everything going on.

## 2020-03-20 ENCOUNTER — TELEPHONE (OUTPATIENT)
Dept: FAMILY MEDICINE CLINIC | Age: 60
End: 2020-03-20

## 2020-03-20 NOTE — TELEPHONE ENCOUNTER
Patient is still having ear problems, states that still feel full and goes throughout her sinuses. She is currently using the ear drops still and is using Mucinex. She is wondering if there is anything else she can try.      Uriah Zhao

## 2020-03-25 ENCOUNTER — TELEPHONE (OUTPATIENT)
Dept: FAMILY MEDICINE CLINIC | Age: 60
End: 2020-03-25

## 2020-03-25 RX ORDER — AMOXICILLIN AND CLAVULANATE POTASSIUM 875; 125 MG/1; MG/1
1 TABLET, FILM COATED ORAL 2 TIMES DAILY
Qty: 20 TABLET | Refills: 0 | Status: SHIPPED | OUTPATIENT
Start: 2020-03-25 | End: 2020-04-04

## 2020-03-25 NOTE — TELEPHONE ENCOUNTER
Patient left a  and states her ear infection is worse, is having a lot more pain above her ear. Wants to know if anything else she can do or take.   Call her back at [phone]

## 2020-04-07 ENCOUNTER — TELEPHONE (OUTPATIENT)
Dept: PRIMARY CARE CLINIC | Age: 60
End: 2020-04-07

## 2020-04-09 ENCOUNTER — TELEPHONE (OUTPATIENT)
Dept: ENT CLINIC | Age: 60
End: 2020-04-09

## 2020-04-10 ENCOUNTER — OFFICE VISIT (OUTPATIENT)
Dept: ENT CLINIC | Age: 60
End: 2020-04-10
Payer: COMMERCIAL

## 2020-04-10 VITALS
BODY MASS INDEX: 48.82 KG/M2 | HEIGHT: 65 IN | RESPIRATION RATE: 14 BRPM | SYSTOLIC BLOOD PRESSURE: 122 MMHG | HEART RATE: 80 BPM | TEMPERATURE: 97.3 F | WEIGHT: 293 LBS | DIASTOLIC BLOOD PRESSURE: 70 MMHG

## 2020-04-10 PROCEDURE — 99213 OFFICE O/P EST LOW 20 MIN: CPT | Performed by: OTOLARYNGOLOGY

## 2020-04-10 RX ORDER — SULFAMETHOXAZOLE AND TRIMETHOPRIM 800; 160 MG/1; MG/1
1 TABLET ORAL 2 TIMES DAILY
Qty: 28 TABLET | Refills: 1 | Status: SHIPPED | OUTPATIENT
Start: 2020-04-10 | End: 2020-04-24

## 2020-04-10 ASSESSMENT — ENCOUNTER SYMPTOMS
NAUSEA: 0
WHEEZING: 0
STRIDOR: 0
FACIAL SWELLING: 0
SHORTNESS OF BREATH: 0
RHINORRHEA: 0
SORE THROAT: 0
ABDOMINAL PAIN: 0
VOMITING: 0
COUGH: 0
APNEA: 0
COLOR CHANGE: 0
CHEST TIGHTNESS: 0
VOICE CHANGE: 0
DIARRHEA: 0
SINUS PRESSURE: 0
CHOKING: 0
TROUBLE SWALLOWING: 0

## 2020-04-10 NOTE — LETTER
[address]  [address] [facility]  Phone: [phone]  Fax: [phone], [place]        April 10, 2020    Chancy Mcardle, [facility], [facility] [address]    Patient: Kriss Vaughn   MR Number: [id number]   YOB: 1960   Date of Visit: 4/10/2020     Dear Chancy Mcardle,    I recently saw your patient, Kriss Vaughn, regarding left-sided otalgia, which turned out to be a pressure sensation with autophony due to a serous otitis. Below are the relevant portions of my assessment and plan of care. Assessment & Plan   Diagnoses and all orders for this visit:     Diagnosis Orders   1. Recurrent acute serous otitis media of left ear  sulfamethoxazole-trimethoprim (BACTRIM DS;SEPTRA DS) 800-160 MG per tablet   2. Wegener's granulomatosis of nose ([address]     3. Chronic frontal sinusitis     4. Chronic ethmoidal sinusitis     5. Chronic maxillary sinusitis     6. Chronic sphenoidal sinusitis         The findings were explained and her questions were answered. Patient is going on 2-1/2 months with a serous otitis in the left ear. It would seem reasonable to increase her dose of Sulfatrim to a therapeutic level for couple weeks to see if there is any improvement. Ventilation tube could be considered. One major issue would be whether with the Rituxan, the myringotomy incision would close down around the tube adequately. Another more rare consideration would be possibility of CSF filling the middle ear. The tegmen tympani is extremely thin on the coronal reconstructions of her sinus images on the left side. The left TM does have an almost normal appearance. Patient sees her rheumatologist in [facility] in 2 weeks. She is given 2 weeks of the Sulfatrim DS at therapeutic levels.   If the fluid persists with no popping on swallowing or bubbles behind the eardrum, consideration should be given to having her see her otologist in [place] regarding a ventilation tube. If necessary, the timing of tube placement could be when the Rituxan has worn off. Some consideration should be given to a high resolution CT of the temporal bones with thin section reconstructions, or even direct coronals. On our PACS system I do not see any calcium in most of the tegmen, and a sinus CT from August at an outside institution reported a left otomastoid effusion        Above images from October, 2019    Return if symptoms worsen or fail to improve. If you have questions, please do not hesitate to call me. I look forward to following Mili Silva along with you.     Sincerely,          Dayna Trimble MD

## 2020-10-15 ENCOUNTER — NURSE ONLY (OUTPATIENT)
Dept: FAMILY MEDICINE CLINIC | Age: 60
End: 2020-10-15
Payer: COMMERCIAL

## 2020-10-15 LAB
ALBUMIN SERPL-MCNC: 4 G/DL (ref 3.5–5.1)
ALP BLD-CCNC: 120 U/L (ref 38–126)
ALT SERPL-CCNC: 21 U/L (ref 11–66)
AMORPHOUS: ABNORMAL
ANION GAP SERPL CALCULATED.3IONS-SCNC: 8 MEQ/L (ref 8–16)
AST SERPL-CCNC: 23 U/L (ref 5–40)
BACTERIA: ABNORMAL
BASOPHILS # BLD: 0.3 %
BASOPHILS ABSOLUTE: 0 THOU/MM3 (ref 0–0.1)
BILIRUB SERPL-MCNC: 0.6 MG/DL (ref 0.3–1.2)
BILIRUBIN URINE: NEGATIVE
BLOOD, URINE: NEGATIVE
BUN BLDV-MCNC: 6 MG/DL (ref 7–22)
CALCIUM SERPL-MCNC: 9.8 MG/DL (ref 8.5–10.5)
CASTS: ABNORMAL /LPF
CASTS: ABNORMAL /LPF
CHARACTER, URINE: CLEAR
CHLORIDE BLD-SCNC: 104 MEQ/L (ref 98–111)
CO2: 28 MEQ/L (ref 23–33)
COLOR: YELLOW
CREAT SERPL-MCNC: 0.7 MG/DL (ref 0.4–1.2)
CRYSTALS: ABNORMAL
CRYSTALS: ABNORMAL
EOSINOPHIL # BLD: 1.8 %
EOSINOPHILS ABSOLUTE: 0.1 THOU/MM3 (ref 0–0.4)
EPITHELIAL CELLS, UA: ABNORMAL /HPF
ERYTHROCYTE [DISTWIDTH] IN BLOOD BY AUTOMATED COUNT: 14.5 % (ref 11.5–14.5)
ERYTHROCYTE [DISTWIDTH] IN BLOOD BY AUTOMATED COUNT: 44.6 FL (ref 35–45)
GFR SERPL CREATININE-BSD FRML MDRD: 86 ML/MIN/1.73M2
GLUCOSE BLD-MCNC: 110 MG/DL (ref 70–108)
GLUCOSE, URINE: NEGATIVE MG/DL
HCT VFR BLD CALC: 47.5 % (ref 37–47)
HEMOGLOBIN: 14.6 GM/DL (ref 12–16)
IMMATURE GRANS (ABS): 0.02 THOU/MM3 (ref 0–0.07)
IMMATURE GRANULOCYTES: 0.3 %
KETONES, URINE: NEGATIVE
LEUKOCYTE ESTERASE, URINE: ABNORMAL
LYMPHOCYTES # BLD: 40.9 %
LYMPHOCYTES ABSOLUTE: 2.9 THOU/MM3 (ref 1–4.8)
MCH RBC QN AUTO: 26.2 PG (ref 26–33)
MCHC RBC AUTO-ENTMCNC: 30.7 GM/DL (ref 32.2–35.5)
MCV RBC AUTO: 85.3 FL (ref 81–99)
MISCELLANEOUS LAB TEST RESULT: ABNORMAL
MISCELLANEOUS LAB TEST RESULT: ABNORMAL
MONOCYTES # BLD: 7.9 %
MONOCYTES ABSOLUTE: 0.6 THOU/MM3 (ref 0.4–1.3)
MUCUS: ABNORMAL
NITRITE, URINE: NEGATIVE
NUCLEATED RED BLOOD CELLS: 0 /100 WBC
PH UA: 6 (ref 5–9)
PLATELET # BLD: 276 THOU/MM3 (ref 130–400)
PMV BLD AUTO: 10.2 FL (ref 9.4–12.4)
POTASSIUM SERPL-SCNC: 4.6 MEQ/L (ref 3.5–5.2)
PROTEIN UA: NEGATIVE MG/DL
RBC # BLD: 5.57 MILL/MM3 (ref 4.2–5.4)
RBC URINE: ABNORMAL /HPF
SEG NEUTROPHILS: 48.8 %
SEGMENTED NEUTROPHILS ABSOLUTE COUNT: 3.5 THOU/MM3 (ref 1.8–7.7)
SODIUM BLD-SCNC: 140 MEQ/L (ref 135–145)
SPECIFIC GRAVITY UA: 1.02 (ref 1–1.03)
TOTAL PROTEIN: 6.8 G/DL (ref 6.1–8)
UROBILINOGEN, URINE: 0.2 EU/DL (ref 0–1)
WBC # BLD: 7.2 THOU/MM3 (ref 4.8–10.8)
WBC UA: ABNORMAL /HPF
YEAST: ABNORMAL

## 2020-10-15 PROCEDURE — 36415 COLL VENOUS BLD VENIPUNCTURE: CPT | Performed by: FAMILY MEDICINE

## 2020-10-15 NOTE — PROGRESS NOTES
CMP, CBC, UA, IGG, IGA, IGM drawn for Dr Joann Tran. Blood work drawn today in the office, venous puncture, left arm, pt tolerated well. This was patients first walk in bloodwork.  They were informed that we will take them today but for now on, they will need to call and schedule an appointment

## 2020-10-16 LAB
IGA: 172 MG/DL (ref 70–400)
IGG: 508 MG/DL (ref 700–1600)
IGM: 54 MG/DL (ref 40–230)

## 2020-12-28 ENCOUNTER — TELEPHONE (OUTPATIENT)
Dept: FAMILY MEDICINE CLINIC | Age: 60
End: 2020-12-28

## 2021-01-12 ENCOUNTER — HOSPITAL ENCOUNTER (OUTPATIENT)
Age: 61
Discharge: HOME | End: 2021-01-12
Payer: COMMERCIAL

## 2021-01-12 PROCEDURE — U0003 INFECTIOUS AGENT DETECTION BY NUCLEIC ACID (DNA OR RNA); SEVERE ACUTE RESPIRATORY SYNDROME CORONAVIRUS 2 (SARS-COV-2) (CORONAVIRUS DISEASE [COVID-19]), AMPLIFIED PROBE TECHNIQUE, MAKING USE OF HIGH THROUGHPUT TECHNOLOGIES AS DESCRIBED BY CMS-2020-01-R: HCPCS

## 2021-01-13 LAB — SARS-COV-2: NOT DETECTED

## 2021-03-09 ENCOUNTER — OFFICE VISIT (OUTPATIENT)
Dept: FAMILY MEDICINE CLINIC | Age: 61
End: 2021-03-09
Payer: COMMERCIAL

## 2021-03-09 VITALS
HEART RATE: 76 BPM | WEIGHT: 293 LBS | RESPIRATION RATE: 18 BRPM | DIASTOLIC BLOOD PRESSURE: 76 MMHG | BODY MASS INDEX: 48.76 KG/M2 | TEMPERATURE: 98 F | SYSTOLIC BLOOD PRESSURE: 124 MMHG

## 2021-03-09 DIAGNOSIS — H66.005 RECURRENT ACUTE SUPPURATIVE OTITIS MEDIA WITHOUT SPONTANEOUS RUPTURE OF LEFT TYMPANIC MEMBRANE: Primary | ICD-10-CM

## 2021-03-09 PROCEDURE — 99213 OFFICE O/P EST LOW 20 MIN: CPT | Performed by: FAMILY MEDICINE

## 2021-03-09 RX ORDER — CIPROFLOXACIN 500 MG/1
500 TABLET, FILM COATED ORAL 2 TIMES DAILY
Qty: 20 TABLET | Refills: 0 | Status: SHIPPED | OUTPATIENT
Start: 2021-03-09 | End: 2021-03-19

## 2021-03-09 ASSESSMENT — ENCOUNTER SYMPTOMS
RHINORRHEA: 0
BACK PAIN: 0
COUGH: 0
WHEEZING: 0
CONSTIPATION: 0
ABDOMINAL PAIN: 0
NAUSEA: 0
CHEST TIGHTNESS: 0
EYE PAIN: 0
VOMITING: 0
BLOOD IN STOOL: 0
DIARRHEA: 0
SORE THROAT: 0
SHORTNESS OF BREATH: 0

## 2021-03-09 NOTE — PROGRESS NOTES
Christina Ellsworth (:  1960) is a 62 y.o. female,Established patient, here for evaluation of the following chief complaint(s):  Otalgia (left ear)      ASSESSMENT/PLAN:  1. Recurrent acute suppurative otitis media without spontaneous rupture of left tympanic membrane  -     ciprofloxacin (CIPRO) 500 MG tablet; Take 1 tablet by mouth 2 times daily for 10 days, Disp-20 tablet, R-0Normal  -add antihistamine of choice   -call if not improving    No follow-ups on file. SUBJECTIVE/OBJECTIVE:  HPI   Pt here for a check up. Reviewed BMI of 49. Encouraged diet, exercise and weight loss. 6 months of left ear fullness, ringing, hears heart beat. Unable to do tube due to wegners( doesn't feel it would heal). Occasional vertigo. No sore throat. No fever or chills. , non smoker, pmh reviewed. Review of Systems   Constitutional: Negative for chills, fatigue, fever and unexpected weight change. HENT: Positive for tinnitus. Negative for congestion, ear pain, rhinorrhea and sore throat. Left ear fullness   Eyes: Negative for pain and visual disturbance. Respiratory: Negative for cough, chest tightness, shortness of breath and wheezing. Cardiovascular: Negative for chest pain and palpitations. Gastrointestinal: Negative for abdominal pain, blood in stool, constipation, diarrhea, nausea and vomiting. Genitourinary: Negative for difficulty urinating, frequency, hematuria and urgency. Musculoskeletal: Negative for back pain, joint swelling, myalgias and neck pain. Skin: Negative for rash. Neurological: Negative for dizziness and headaches. Hematological: Negative for adenopathy. Does not bruise/bleed easily. Psychiatric/Behavioral: Negative for behavioral problems and sleep disturbance. The patient is not nervous/anxious. Physical Exam  Vitals signs and nursing note reviewed. Constitutional:       Appearance: She is well-developed.    HENT:      Head: Normocephalic and atraumatic. Right Ear: External ear normal.      Left Ear: External ear normal. Tympanic membrane is erythematous (cage in color). Nose: Nose normal.      Mouth/Throat:      Mouth: Mucous membranes are moist.   Eyes:      Pupils: Pupils are equal, round, and reactive to light. Neck:      Musculoskeletal: Neck supple. Thyroid: No thyromegaly. Cardiovascular:      Rate and Rhythm: Normal rate and regular rhythm. Heart sounds: Normal heart sounds. Pulmonary:      Breath sounds: Normal breath sounds. No wheezing or rales. Abdominal:      General: Bowel sounds are normal.      Palpations: Abdomen is soft. Tenderness: There is no abdominal tenderness. There is no guarding or rebound. Musculoskeletal: Normal range of motion. Lymphadenopathy:      Cervical: No cervical adenopathy. Skin:     General: Skin is warm and dry. Findings: No rash. Neurological:      Mental Status: She is alert and oriented to person, place, and time. Cranial Nerves: No cranial nerve deficit. Deep Tendon Reflexes: Reflexes are normal and symmetric. An electronic signature was used to authenticate this note.     --Isidoro Shafer MD

## 2021-03-09 NOTE — PATIENT INSTRUCTIONS
Patient Education        Ear Infection (Otitis Media): Care Instructions  Overview     An ear infection may start with a cold and affect the middle ear (otitis media). It can hurt a lot. Most ear infections clear up on their own in a couple of days and do not need antibiotics. Also, antibiotics do not work against viruses, which may be the cause of your infection. Regular doses of pain relievers are the best way to reduce your fever and help you feel better. Follow-up care is a key part of your treatment and safety. Be sure to make and go to all appointments, and call your doctor if you are having problems. It's also a good idea to know your test results and keep a list of the medicines you take. How can you care for yourself at home? · Take pain medicines exactly as directed. ? If the doctor gave you a prescription medicine for pain, take it as prescribed. ? If you are not taking a prescription pain medicine, take an over-the-counter medicine, such as acetaminophen (Tylenol), ibuprofen (Advil, Motrin), or naproxen (Aleve). Read and follow all instructions on the label. ? Do not take two or more pain medicines at the same time unless the doctor told you to. Many pain medicines have acetaminophen, which is Tylenol. Too much acetaminophen (Tylenol) can be harmful. · Plan to take a full dose of pain reliever before bedtime. Getting enough sleep will help you get better. · Try a warm, moist washcloth on the ear. It may help relieve pain. · If your doctor prescribed antibiotics, take them as directed. Do not stop taking them just because you feel better. You need to take the full course of antibiotics. When should you call for help? Call your doctor now or seek immediate medical care if:    · You have new or increasing ear pain.     · You have new or increasing pus or blood draining from your ear.     · You have a fever with a stiff neck or a severe headache.    Watch closely for changes in your health, and be sure to contact your doctor if:    · You have new or worse symptoms.     · You are not getting better after taking an antibiotic for 2 days. Where can you learn more? Go to https://Move LootpeVidmaker.AvidRetail. org and sign in to your SaleStream account. Enter A414 in the Absolute Commerce box to learn more about \"Ear Infection (Otitis Media): Care Instructions. \"     If you do not have an account, please click on the \"Sign Up Now\" link. Current as of: April 15, 2020               Content Version: 12.6  © 6436-1963 Open Wager, Incorporated. Care instructions adapted under license by [facility] ([facility]). If you have questions about a medical condition or this instruction, always ask your healthcare professional. [address] any warranty or liability for your use of this information.

## 2021-03-25 ENCOUNTER — TELEPHONE (OUTPATIENT)
Dept: FAMILY MEDICINE CLINIC | Age: 61
End: 2021-03-25

## 2021-03-25 NOTE — TELEPHONE ENCOUNTER
Patient is having tomography for extra testing. Patient is scheduled in April. Patient states every time she has mammogram done she has to have ultrasound, ect.

## 2021-07-19 ENCOUNTER — OFFICE VISIT (OUTPATIENT)
Dept: FAMILY MEDICINE CLINIC | Age: 61
End: 2021-07-19
Payer: COMMERCIAL

## 2021-07-19 VITALS
BODY MASS INDEX: 47.38 KG/M2 | TEMPERATURE: 97.8 F | WEIGHT: 284.4 LBS | OXYGEN SATURATION: 97 % | RESPIRATION RATE: 16 BRPM | HEART RATE: 79 BPM | HEIGHT: 65 IN | DIASTOLIC BLOOD PRESSURE: 66 MMHG | SYSTOLIC BLOOD PRESSURE: 114 MMHG

## 2021-07-19 DIAGNOSIS — H66.005 RECURRENT ACUTE SUPPURATIVE OTITIS MEDIA WITHOUT SPONTANEOUS RUPTURE OF LEFT TYMPANIC MEMBRANE: Primary | ICD-10-CM

## 2021-07-19 DIAGNOSIS — B37.2 YEAST DERMATITIS: ICD-10-CM

## 2021-07-19 PROBLEM — D64.9 ANEMIA: Status: ACTIVE | Noted: 2021-07-19

## 2021-07-19 PROCEDURE — 99213 OFFICE O/P EST LOW 20 MIN: CPT | Performed by: FAMILY MEDICINE

## 2021-07-19 RX ORDER — CLOTRIMAZOLE AND BETAMETHASONE DIPROPIONATE 10; .64 MG/G; MG/G
CREAM TOPICAL
Qty: 45 G | Refills: 0 | Status: SHIPPED | OUTPATIENT
Start: 2021-07-19

## 2021-07-19 RX ORDER — CIPROFLOXACIN 500 MG/1
500 TABLET, FILM COATED ORAL 2 TIMES DAILY
Qty: 20 TABLET | Refills: 0 | Status: SHIPPED | OUTPATIENT
Start: 2021-07-19 | End: 2021-07-29

## 2021-07-19 SDOH — ECONOMIC STABILITY: FOOD INSECURITY: WITHIN THE PAST 12 MONTHS, YOU WORRIED THAT YOUR FOOD WOULD RUN OUT BEFORE YOU GOT MONEY TO BUY MORE.: NEVER TRUE

## 2021-07-19 SDOH — ECONOMIC STABILITY: FOOD INSECURITY: WITHIN THE PAST 12 MONTHS, THE FOOD YOU BOUGHT JUST DIDN'T LAST AND YOU DIDN'T HAVE MONEY TO GET MORE.: NEVER TRUE

## 2021-07-19 ASSESSMENT — PATIENT HEALTH QUESTIONNAIRE - PHQ9
SUM OF ALL RESPONSES TO PHQ QUESTIONS 1-9: 0
2. FEELING DOWN, DEPRESSED OR HOPELESS: 0
SUM OF ALL RESPONSES TO PHQ9 QUESTIONS 1 & 2: 0
1. LITTLE INTEREST OR PLEASURE IN DOING THINGS: 0

## 2021-07-19 ASSESSMENT — ENCOUNTER SYMPTOMS
NAUSEA: 0
ABDOMINAL PAIN: 0
VOMITING: 0
CONSTIPATION: 0
SORE THROAT: 0
BLOOD IN STOOL: 0
EYE PAIN: 0
COUGH: 0
BACK PAIN: 0
RHINORRHEA: 0
SHORTNESS OF BREATH: 0
CHEST TIGHTNESS: 0
DIARRHEA: 0
WHEEZING: 0

## 2021-07-19 ASSESSMENT — SOCIAL DETERMINANTS OF HEALTH (SDOH): HOW HARD IS IT FOR YOU TO PAY FOR THE VERY BASICS LIKE FOOD, HOUSING, MEDICAL CARE, AND HEATING?: NOT VERY HARD

## 2021-07-19 NOTE — PATIENT INSTRUCTIONS
be sure to contact your doctor if:    · You have new or worse symptoms.     · You are not getting better after taking an antibiotic for 2 days. Where can you learn more? Go to https://Rose IslandpepicewMoni.Askem. org and sign in to your US Dataworks account. Enter S794 in the formerly [facility] box to learn more about \"Ear Infection (Otitis Media): Care Instructions. \"     If you do not have an account, please click on the \"Sign Up Now\" link. Current as of: December 2, 2020               Content Version: 12.9  © 2006-2021 Bikanta. Care instructions adapted under license by [facility] ([facility]). If you have questions about a medical condition or this instruction, always ask your healthcare professional. [address] any warranty or liability for your use of this information. Patient Education        Yeast Skin Infection: Care Instructions  Your Care Instructions     Yeast normally lives on your skin. Sometimes too much yeast can overgrow in certain areas of the skin and cause an infection. The infection causes red, scaly, moist patches on your skin that may itch. Common areas for skin yeast infections are skin folds under the breasts or belly area. The warm and moist areas in the skin folds can make it easier for yeast to overgrow. Yeast infections also can be found on other parts of the body such as the groin or armpits. You will probably get a cream or ointment that contains an antifungal medicine. Examples of these are miconazole and clotrimazole. You put it on your skin to treat the infection. Your doctor may give you a prescription for the cream or ointment. Or you may be able to buy it without a prescription at most drugstores. If the infection is severe, the doctor will prescribe antifungal pills. A yeast infection usually goes away after about a week of treatment. But it's important to use the medicine for as long as your doctor tells you to.   Follow-up care is a key part of your treatment and safety. Be sure to make and go to all appointments, and call your doctor if you are having problems. It's also a good idea to know your test results and keep a list of the medicines you take. How can you care for yourself at home? · Be safe with medicines. Take your medicines exactly as prescribed. Call your doctor if you think you are having a problem with your medicine. · Keep your skin clean and dry. Your doctor may suggest using powder that contains an antifungal medicine in the skin folds. · Wear loose clothing. When should you call for help? Call your doctor now or seek immediate medical care if:    · You have symptoms of infection, such as:  ? Increased pain, swelling, warmth, or redness. ? Red streaks leading from the area. ? Pus draining from the area. ? A fever. Watch closely for changes in your health, and be sure to contact your doctor if:    · You do not get better as expected. Where can you learn more? Go to https://EGG EnergypePredictus BioScienceseb.LabPixies. org and sign in to your Quizens account. Enter B658 in the TriActive box to learn more about \"Yeast Skin Infection: Care Instructions. \"     If you do not have an account, please click on the \"Sign Up Now\" link. Current as of: March 3, 2021               Content Version: 12.9  © 9534-7906 Healthwise, Incorporated. Care instructions adapted under license by [facility] ([facility]). If you have questions about a medical condition or this instruction, always ask your healthcare professional. [place] [address] any warranty or liability for your use of this information.

## 2021-07-19 NOTE — PROGRESS NOTES
Xiomara Chen (:  1960) is a 62 y.o. female,Established patient, here for evaluation of the following chief complaint(s):  Otalgia (left ear pain and loss of hearing after a shingles shot, rash)         ASSESSMENT/PLAN:  1. Recurrent acute suppurative otitis media without spontaneous rupture of left tympanic membrane  -     ciprofloxacin (CIPRO) 500 MG tablet; Take 1 tablet by mouth 2 times daily for 10 days, Disp-20 tablet, R-0Normal  2. Yeast dermatitis  -     clotrimazole-betamethasone (LOTRISONE) 1-0.05 % cream; Apply topically 2 times daily. , Disp-45 g, R-0, Normal  -monitor sxs, call if not improving    No follow-ups on file. Subjective   SUBJECTIVE/OBJECTIVE:  HPI   Pt here for a check up. Reviewed BMI of 48. Encouraged diet, exercise and weight loss. Left ear pain and decreased hearing. Does have a tube in the left ear. Started after her first shingrix vaccine. Also a  Rash under left arm. Just red. Denies pain or itch. , non smoker, pmh reviewed. Review of Systems   Constitutional: Negative for chills, fatigue, fever and unexpected weight change. HENT: Positive for ear pain and hearing loss. Negative for congestion, rhinorrhea and sore throat. Eyes: Negative for pain and visual disturbance. Respiratory: Negative for cough, chest tightness, shortness of breath and wheezing. Cardiovascular: Negative for chest pain and palpitations. Gastrointestinal: Negative for abdominal pain, blood in stool, constipation, diarrhea, nausea and vomiting. Genitourinary: Negative for difficulty urinating, frequency, hematuria and urgency. Musculoskeletal: Negative for back pain, joint swelling, myalgias and neck pain. Skin: Positive for rash. Neurological: Negative for dizziness and headaches. Hematological: Negative for adenopathy. Does not bruise/bleed easily. Psychiatric/Behavioral: Negative for behavioral problems and sleep disturbance.  The patient is not nervous/anxious. Objective   Physical Exam  Vitals and nursing note reviewed. Constitutional:       Appearance: She is well-developed. HENT:      Head: Normocephalic and atraumatic. Right Ear: External ear normal.      Left Ear: External ear normal. Decreased hearing noted. Swelling present. A PE tube is present. Tympanic membrane is erythematous. Nose: Nose normal.      Mouth/Throat:      Mouth: Mucous membranes are moist.   Eyes:      Pupils: Pupils are equal, round, and reactive to light. Neck:      Thyroid: No thyromegaly. Cardiovascular:      Rate and Rhythm: Normal rate and regular rhythm. Heart sounds: Normal heart sounds. Pulmonary:      Breath sounds: Normal breath sounds. No wheezing or rales. Abdominal:      General: Bowel sounds are normal.      Palpations: Abdomen is soft. Tenderness: There is no abdominal tenderness. There is no guarding or rebound. Musculoskeletal:         General: Normal range of motion. Cervical back: Neck supple. Lymphadenopathy:      Cervical: No cervical adenopathy. Skin:     General: Skin is warm and dry. Findings: Erythema and rash present. Rash is papular. Neurological:      Mental Status: She is alert and oriented to person, place, and time. Cranial Nerves: No cranial nerve deficit. Deep Tendon Reflexes: Reflexes are normal and symmetric. An electronic signature was used to authenticate this note.     --Lisa Walker MD

## 2022-03-11 ENCOUNTER — OFFICE VISIT (OUTPATIENT)
Dept: FAMILY MEDICINE CLINIC | Age: 62
End: 2022-03-11
Payer: COMMERCIAL

## 2022-03-11 VITALS
HEIGHT: 65 IN | SYSTOLIC BLOOD PRESSURE: 126 MMHG | RESPIRATION RATE: 18 BRPM | DIASTOLIC BLOOD PRESSURE: 74 MMHG | WEIGHT: 262.2 LBS | TEMPERATURE: 97.8 F | BODY MASS INDEX: 43.68 KG/M2 | HEART RATE: 89 BPM | OXYGEN SATURATION: 96 %

## 2022-03-11 DIAGNOSIS — J01.90 ACUTE BACTERIAL SINUSITIS: Primary | ICD-10-CM

## 2022-03-11 DIAGNOSIS — B96.89 ACUTE BACTERIAL SINUSITIS: Primary | ICD-10-CM

## 2022-03-11 DIAGNOSIS — R05.9 COUGH: ICD-10-CM

## 2022-03-11 PROCEDURE — 99213 OFFICE O/P EST LOW 20 MIN: CPT | Performed by: FAMILY MEDICINE

## 2022-03-11 RX ORDER — CIPROFLOXACIN 500 MG/1
500 TABLET, FILM COATED ORAL 2 TIMES DAILY
Qty: 20 TABLET | Refills: 0 | Status: SHIPPED | OUTPATIENT
Start: 2022-03-11 | End: 2022-03-21

## 2022-03-11 RX ORDER — BENZONATATE 200 MG/1
200 CAPSULE ORAL 3 TIMES DAILY PRN
Qty: 30 CAPSULE | Refills: 0 | Status: SHIPPED | OUTPATIENT
Start: 2022-03-11 | End: 2022-06-03 | Stop reason: ALTCHOICE

## 2022-03-11 ASSESSMENT — ENCOUNTER SYMPTOMS
CONSTIPATION: 0
VOMITING: 0
ABDOMINAL PAIN: 0
BACK PAIN: 0
EYE PAIN: 0
SORE THROAT: 0
RHINORRHEA: 0
BLOOD IN STOOL: 0
NAUSEA: 0
COUGH: 1
SINUS PRESSURE: 1
WHEEZING: 0
DIARRHEA: 0
CHEST TIGHTNESS: 0
SHORTNESS OF BREATH: 0

## 2022-03-11 NOTE — PROGRESS NOTES
Appearance: She is well-developed. HENT:      Head: Normocephalic and atraumatic. Right Ear: External ear normal.      Left Ear: External ear normal. Drainage and tenderness present. A PE tube is present. Tympanic membrane is injected. Nose:      Right Sinus: Maxillary sinus tenderness present. Left Sinus: Maxillary sinus tenderness present. Mouth/Throat:      Mouth: Mucous membranes are moist.   Eyes:      Pupils: Pupils are equal, round, and reactive to light. Neck:      Thyroid: No thyromegaly. Cardiovascular:      Rate and Rhythm: Normal rate and regular rhythm. Heart sounds: Normal heart sounds. Pulmonary:      Breath sounds: Normal breath sounds. No wheezing or rales. Abdominal:      General: Bowel sounds are normal.      Palpations: Abdomen is soft. Tenderness: There is no abdominal tenderness. There is no guarding or rebound. Musculoskeletal:         General: Normal range of motion. Cervical back: Neck supple. Lymphadenopathy:      Cervical: Cervical adenopathy present. Right cervical: Superficial cervical adenopathy present. Left cervical: Superficial cervical adenopathy present. Skin:     General: Skin is warm and dry. Findings: No rash. Neurological:      Mental Status: She is alert and oriented to person, place, and time. Cranial Nerves: No cranial nerve deficit. Deep Tendon Reflexes: Reflexes are normal and symmetric. An electronic signature was used to authenticate this note.     --Kerri Delcid MD

## 2022-03-11 NOTE — PATIENT INSTRUCTIONS
Patient Education        Sinusitis: Care Instructions  Your Care Instructions     Sinusitis is an infection of the lining of the sinus cavities in your head. Sinusitis often follows a cold. It causes pain and pressure in your head and face. In most cases, sinusitis gets better on its own in 1 to 2 weeks. But some mild symptoms may last for several weeks. Sometimes antibiotics are needed. Follow-up care is a key part of your treatment and safety. Be sure to make and go to all appointments, and call your doctor if you are having problems. It's also a good idea to know your test results and keep a list of the medicines you take. How can you care for yourself at home? · Take an over-the-counter pain medicine, such as acetaminophen (Tylenol), ibuprofen (Advil, Motrin), or naproxen (Aleve). Read and follow all instructions on the label. · If the doctor prescribed antibiotics, take them as directed. Do not stop taking them just because you feel better. You need to take the full course of antibiotics. · Be careful when taking over-the-counter cold or flu medicines and Tylenol at the same time. Many of these medicines have acetaminophen, which is Tylenol. Read the labels to make sure that you are not taking more than the recommended dose. Too much acetaminophen (Tylenol) can be harmful. · Breathe warm, moist air from a steamy shower, a hot bath, or a sink filled with hot water. Avoid cold, dry air. Using a humidifier in your home may help. Follow the directions for cleaning the machine. · Use saline (saltwater) nasal washes. This can help keep your nasal passages open and wash out mucus and bacteria. You can buy saline nose drops at a grocery store or drugstore. Or you can make your own at home by adding 1 teaspoon of salt and 1 teaspoon of baking soda to 2 cups of distilled water. If you make your own, fill a bulb syringe with the solution, insert the tip into your nostril, and squeeze gently.  Cali Lehman your care is a key part of your treatment and safety. Be sure to make and go to all appointments, and call your doctor if you are having problems. It's also a good idea to know your test results and keep a list of the medicines you take. How can you care for yourself at home? · Drink lots of water and other fluids. This helps thin the mucus and soothes a dry or sore throat. Honey or lemon juice in hot water or tea may ease a dry cough. · Take cough medicine as directed by your doctor. · Prop up your head on pillows to help you breathe and ease a dry cough. · Try cough drops to soothe a dry or sore throat. Cough drops don't stop a cough. Medicine-flavored cough drops are no better than candy-flavored drops or hard candy. · Do not smoke. Avoid secondhand smoke. If you need help quitting, talk to your doctor about stop-smoking programs and medicines. These can increase your chances of quitting for good. When should you call for help? Call 911 anytime you think you may need emergency care. For example, call if:    · You have severe trouble breathing. Call your doctor now or seek immediate medical care if:    · You cough up blood.     · You have new or worse trouble breathing.     · You have a new or higher fever.     · You have a new rash. Watch closely for changes in your health, and be sure to contact your doctor if:    · You cough more deeply or more often, especially if you notice more mucus or a change in the color of your mucus.     · You have new symptoms, such as a sore throat, an earache, or sinus pain.     · You do not get better as expected. Where can you learn more? Go to https://Roozz.compe51Talk.goTenna. org and sign in to your Ifbyphone account. Enter D279 in the Manifest box to learn more about \"Cough: Care Instructions. \"     If you do not have an account, please click on the \"Sign Up Now\" link.   Current as of: July 6, 2021               Content Version: 13.1  © 3727-0217 Healthwise, Incorporated. Care instructions adapted under license by [facility] ([facility]). If you have questions about a medical condition or this instruction, always ask your healthcare professional. [address] any warranty or liability for your use of this information.

## 2022-03-28 DIAGNOSIS — Z12.39 ENCOUNTER FOR BREAST CANCER SCREENING USING NON-MAMMOGRAM MODALITY: Primary | ICD-10-CM

## 2022-06-03 ENCOUNTER — OFFICE VISIT (OUTPATIENT)
Dept: FAMILY MEDICINE CLINIC | Age: 62
End: 2022-06-03
Payer: COMMERCIAL

## 2022-06-03 VITALS
RESPIRATION RATE: 18 BRPM | SYSTOLIC BLOOD PRESSURE: 120 MMHG | WEIGHT: 259.4 LBS | HEIGHT: 64 IN | DIASTOLIC BLOOD PRESSURE: 66 MMHG | BODY MASS INDEX: 44.28 KG/M2 | OXYGEN SATURATION: 95 % | TEMPERATURE: 97.4 F | HEART RATE: 77 BPM

## 2022-06-03 DIAGNOSIS — I77.82 ANCA-ASSOCIATED VASCULITIS: ICD-10-CM

## 2022-06-03 DIAGNOSIS — K13.79 MUCOCELE OF MOUTH: Primary | ICD-10-CM

## 2022-06-03 PROCEDURE — 99213 OFFICE O/P EST LOW 20 MIN: CPT | Performed by: FAMILY MEDICINE

## 2022-06-03 ASSESSMENT — PATIENT HEALTH QUESTIONNAIRE - PHQ9
SUM OF ALL RESPONSES TO PHQ QUESTIONS 1-9: 0
2. FEELING DOWN, DEPRESSED OR HOPELESS: 0
SUM OF ALL RESPONSES TO PHQ QUESTIONS 1-9: 0
1. LITTLE INTEREST OR PLEASURE IN DOING THINGS: 0
SUM OF ALL RESPONSES TO PHQ9 QUESTIONS 1 & 2: 0
SUM OF ALL RESPONSES TO PHQ QUESTIONS 1-9: 0
SUM OF ALL RESPONSES TO PHQ QUESTIONS 1-9: 0

## 2022-06-03 ASSESSMENT — ENCOUNTER SYMPTOMS
VOMITING: 0
EYE PAIN: 0
NAUSEA: 0
RHINORRHEA: 0
CONSTIPATION: 0
ABDOMINAL PAIN: 0
COUGH: 0
BACK PAIN: 0
WHEEZING: 0
SORE THROAT: 0
CHEST TIGHTNESS: 0
SHORTNESS OF BREATH: 0
BLOOD IN STOOL: 0
DIARRHEA: 0

## 2022-06-03 NOTE — PROGRESS NOTES
Mimi Zuniga (:  1960) is a 62 y.o. female,Established patient, here for evaluation of the following chief complaint(s):  Mouth Lesions (chiropracter thinks patient had a mini stroke)         ASSESSMENT/PLAN:  1. Mucocele of mouth  -ok for dental cleaning, note given, will need referred to oral surgery by dentist  2. ANCA-associated vasculitis ([facility] [address]  -seeing [facility], upcoming MRI    No follow-ups on file. Subjective   SUBJECTIVE/OBJECTIVE:  HPI  Patient here today for a check up. Reviewed BMI of 44. Encouraged diet, exercise and weight loss. Went to dentist for cleaning. Possible mini stroke, MRI ordered for the 2022. Sxs all started around 2022. , non smoker, pmh reviewed. Review of Systems   Constitutional: Negative for chills, fatigue, fever and unexpected weight change. HENT: Positive for mouth sores. Negative for congestion, ear pain, rhinorrhea and sore throat. Eyes: Negative for pain and visual disturbance. Respiratory: Negative for cough, chest tightness, shortness of breath and wheezing. Cardiovascular: Negative for chest pain and palpitations. Gastrointestinal: Negative for abdominal pain, blood in stool, constipation, diarrhea, nausea and vomiting. Genitourinary: Negative for difficulty urinating, frequency, hematuria and urgency. Musculoskeletal: Negative for back pain, joint swelling, myalgias and neck pain. Skin: Negative for rash. Neurological: Negative for dizziness and headaches. Hematological: Negative for adenopathy. Does not bruise/bleed easily. Psychiatric/Behavioral: Negative for behavioral problems and sleep disturbance. The patient is not nervous/anxious. Objective   Physical Exam  Vitals and nursing note reviewed. Constitutional:       Appearance: She is well-developed. HENT:      Head: Normocephalic and atraumatic.       Right Ear: External ear normal.      Left Ear: External ear normal.      Nose: Nose normal. Mouth/Throat:      Mouth: Mucous membranes are moist.     Eyes:      Pupils: Pupils are equal, round, and reactive to light. Neck:      Thyroid: No thyromegaly. Cardiovascular:      Rate and Rhythm: Normal rate and regular rhythm. Heart sounds: Normal heart sounds. Pulmonary:      Breath sounds: Normal breath sounds. No wheezing or rales. Abdominal:      General: Bowel sounds are normal.      Palpations: Abdomen is soft. Tenderness: There is no abdominal tenderness. There is no guarding or rebound. Musculoskeletal:         General: Normal range of motion. Cervical back: Neck supple. Lymphadenopathy:      Cervical: No cervical adenopathy. Skin:     General: Skin is warm and dry. Findings: No rash. Neurological:      Mental Status: She is alert and oriented to person, place, and time. Cranial Nerves: No cranial nerve deficit. Deep Tendon Reflexes: Reflexes are normal and symmetric. An electronic signature was used to authenticate this note.     --Marian Ceron MD

## 2022-06-03 NOTE — PATIENT INSTRUCTIONS
IV abx   ID following      Patient Education        Learning About Vasculitis  What is vasculitis? Vasculitis means inflamed blood vessels. This happens when the body's ownimmune system attacks the blood vessels. Your immune system might be reacting to an infection or a medicine. Or you mayhave an immune disorder. Vasculitis causes blood vessel walls to thicken, weaken, or stretch. This makes it harder for blood to flow. And that can lead to symptoms in any parts of yourbody that aren't getting enough blood. There are different types of vasculitis. And different parts of the body can be affected. This includes the head, joints and muscles, the skin, and someinternal organs. What can you expect when you have vasculitis? Vasculitis can cause a wide variety of symptoms. Which ones you have willdepend on what blood vessels are involved and how serious the problem is. Some common symptoms are:   Fever.  Losing weight and not feeling hungry.  General tiredness.  General aches and pains.  Pain and swelling in an arm, a leg, or some other body part where the blood vessels are inflamed. For some people, the problem is short-term. For others it is long-term, orchronic. This problem may also go away, only to come back again later. How is it treated? The main goal of treatment is to reduce inflammation in the blood vessels. Mild cases may go away on their own. Sometimes over-the-counter pain medicine helps. For more severe cases, a doctor might prescribe stronger medicine, suchas a corticosteroid. Follow-up care is a key part of your treatment and safety. Be sure to make and go to all appointments, and call your doctor if you are having problems. It's also a good idea to know your test results and keep alist of the medicines you take. Where can you learn more? Go to https://priya.Circle Internet Financial. org and sign in to your Arlettie account.  Enter [id number] in the YourEncore box to learn more about \"Learning About Vasculitis. \"     If you do not have an account, please click on the \"Sign Up Now\" link. Current as of: July 1, 2021               Content Version: 13.2  © 0606-4723 Healthwise, Incorporated. Care instructions adapted under license by [facility] ([facility]). If you have questions about a medical condition or this instruction, always ask your healthcare professional. [address] any warranty or liability for your use of this information.

## 2022-06-03 NOTE — LETTER
[address] [address]  Phone: [phone]  Fax: [phone]    Javad Torres MD        Majo 3, 2022     Patient: Darlys Meckel   YOB: 1960   Date of Visit: 6/3/2022       To Whom It May Concern: It is my medical opinion that Sandoval Salas can undergo dental cleaning safely. If you have any questions or concerns, please don't hesitate to call.     Sincerely,        Javad Torres MD

## 2022-12-07 ENCOUNTER — TELEPHONE (OUTPATIENT)
Dept: FAMILY MEDICINE CLINIC | Age: 62
End: 2022-12-07

## 2022-12-07 NOTE — LETTER
[address] [address]  Phone: [phone]  Fax: [phone]    Saranya Mccormick MD        December 7, 2022     Patient: Carlos Isbell   YOB: 1960           To Whom It May Concern: It is my medical opinion that Jayda Arndt is unable to perform jury duty at this time due to recent surgery, hearing deficits and is currently undergoing chemo treatments . If you have any questions or concerns, please don't hesitate to call.     Sincerely,        Saranya Mccormick MD

## 2022-12-07 NOTE — TELEPHONE ENCOUNTER
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 She states the chemo has decreased her antibodies and she goes to [place] and receives antibodies.

## 2022-12-07 NOTE — TELEPHONE ENCOUNTER
The pt states she has received a jury duty letter and states that she is unable to do this because she had recent surgery/chemo and she has limited hearing. She is wondering whether or not she should contact her surgeon regarding excuse of jury duty. She is requesting a call back when Dr. Mp Byrd responds.

## 2023-01-31 ENCOUNTER — TELEPHONE (OUTPATIENT)
Dept: FAMILY MEDICINE CLINIC | Age: 63
End: 2023-01-31

## 2023-01-31 NOTE — TELEPHONE ENCOUNTER
Patient left a vm stating she received a call from [facility] telling her she is due for her at home colorectal check and to call the office. Pt has not had physical since 2016  Please call pt to see if we can schedule her a wellness.

## 2023-04-17 ENCOUNTER — OFFICE VISIT (OUTPATIENT)
Dept: FAMILY MEDICINE CLINIC | Age: 63
End: 2023-04-17
Payer: COMMERCIAL

## 2023-04-17 VITALS
HEART RATE: 76 BPM | WEIGHT: 265.6 LBS | BODY MASS INDEX: 45.03 KG/M2 | RESPIRATION RATE: 16 BRPM | SYSTOLIC BLOOD PRESSURE: 124 MMHG | DIASTOLIC BLOOD PRESSURE: 76 MMHG | TEMPERATURE: 97 F

## 2023-04-17 DIAGNOSIS — R92.8 ABNORMAL BREAST THERMOGRAPHY: Primary | ICD-10-CM

## 2023-04-17 PROBLEM — D80.1 HYPOGAMMAGLOBULINEMIA (HCC): Status: ACTIVE | Noted: 2022-08-25

## 2023-04-17 PROCEDURE — 99214 OFFICE O/P EST MOD 30 MIN: CPT | Performed by: FAMILY MEDICINE

## 2023-04-17 RX ORDER — SULFAMETHOXAZOLE AND TRIMETHOPRIM 800; 160 MG/1; MG/1
1 TABLET ORAL DAILY
COMMUNITY

## 2023-04-17 RX ORDER — ENTECAVIR 0.5 MG/1
0.5 TABLET, FILM COATED ORAL DAILY
COMMUNITY

## 2023-04-17 SDOH — ECONOMIC STABILITY: INCOME INSECURITY: HOW HARD IS IT FOR YOU TO PAY FOR THE VERY BASICS LIKE FOOD, HOUSING, MEDICAL CARE, AND HEATING?: NOT HARD AT ALL

## 2023-04-17 SDOH — ECONOMIC STABILITY: FOOD INSECURITY: WITHIN THE PAST 12 MONTHS, YOU WORRIED THAT YOUR FOOD WOULD RUN OUT BEFORE YOU GOT MONEY TO BUY MORE.: NEVER TRUE

## 2023-04-17 SDOH — ECONOMIC STABILITY: HOUSING INSECURITY
IN THE LAST 12 MONTHS, WAS THERE A TIME WHEN YOU DID NOT HAVE A STEADY PLACE TO SLEEP OR SLEPT IN A SHELTER (INCLUDING NOW)?: NO

## 2023-04-17 SDOH — ECONOMIC STABILITY: FOOD INSECURITY: WITHIN THE PAST 12 MONTHS, THE FOOD YOU BOUGHT JUST DIDN'T LAST AND YOU DIDN'T HAVE MONEY TO GET MORE.: NEVER TRUE

## 2023-04-17 ASSESSMENT — PATIENT HEALTH QUESTIONNAIRE - PHQ9
SUM OF ALL RESPONSES TO PHQ QUESTIONS 1-9: 0
1. LITTLE INTEREST OR PLEASURE IN DOING THINGS: 0
SUM OF ALL RESPONSES TO PHQ QUESTIONS 1-9: 0
SUM OF ALL RESPONSES TO PHQ QUESTIONS 1-9: 0
SUM OF ALL RESPONSES TO PHQ9 QUESTIONS 1 & 2: 0
SUM OF ALL RESPONSES TO PHQ QUESTIONS 1-9: 0
2. FEELING DOWN, DEPRESSED OR HOPELESS: 0

## 2023-04-17 ASSESSMENT — ENCOUNTER SYMPTOMS
SORE THROAT: 0
CHEST TIGHTNESS: 0
DIARRHEA: 0
RHINORRHEA: 0
BLOOD IN STOOL: 0
BACK PAIN: 0
SHORTNESS OF BREATH: 0
ABDOMINAL PAIN: 0
NAUSEA: 0
WHEEZING: 0
COUGH: 0
VOMITING: 0
CONSTIPATION: 0
EYE PAIN: 0

## 2023-04-17 NOTE — PROGRESS NOTES
Nose: Nose normal.      Mouth/Throat:      Mouth: Mucous membranes are moist.   Eyes:      Pupils: Pupils are equal, round, and reactive to light. Neck:      Thyroid: No thyromegaly. Cardiovascular:      Rate and Rhythm: Normal rate and regular rhythm. Heart sounds: Normal heart sounds. Pulmonary:      Breath sounds: Normal breath sounds. No wheezing or rales. Abdominal:      General: Bowel sounds are normal.      Palpations: Abdomen is soft. Tenderness: There is no abdominal tenderness. There is no guarding or rebound. Musculoskeletal:         General: Normal range of motion. Cervical back: Neck supple. Lymphadenopathy:      Cervical: No cervical adenopathy. Skin:     General: Skin is warm and dry. Findings: No rash. Neurological:      Mental Status: She is alert and oriented to person, place, and time. Cranial Nerves: No cranial nerve deficit. Deep Tendon Reflexes: Reflexes are normal and symmetric. An electronic signature was used to authenticate this note.     --Carey Harden MD

## 2023-04-25 ENCOUNTER — HOSPITAL ENCOUNTER (OUTPATIENT)
Dept: WOMENS IMAGING | Age: 63
Discharge: HOME | End: 2023-04-25
Payer: COMMERCIAL

## 2023-04-25 ENCOUNTER — TELEPHONE (OUTPATIENT)
Dept: FAMILY MEDICINE CLINIC | Age: 63
End: 2023-04-25

## 2023-04-25 DIAGNOSIS — R92.8 ABNORMAL BREAST THERMOGRAPHY: ICD-10-CM

## 2023-04-25 PROCEDURE — 76642 ULTRASOUND BREAST LIMITED: CPT

## 2023-04-25 NOTE — TELEPHONE ENCOUNTER
----- Message from Eduin Ybarra MD sent at 4/25/2023  8:36 AM EDT -----  Notify breast u/s is normal, no abnormal areas were seen.

## 2023-04-28 ENCOUNTER — TELEPHONE (OUTPATIENT)
Dept: FAMILY MEDICINE CLINIC | Age: 63
End: 2023-04-28

## 2023-04-28 ENCOUNTER — HOSPITAL ENCOUNTER (OUTPATIENT)
Dept: WOMENS IMAGING | Age: 63
Discharge: HOME | End: 2023-04-28
Payer: COMMERCIAL

## 2023-04-28 DIAGNOSIS — Z12.31 VISIT FOR SCREENING MAMMOGRAM: ICD-10-CM

## 2023-04-28 PROCEDURE — 77063 BREAST TOMOSYNTHESIS BI: CPT

## 2023-04-28 NOTE — TELEPHONE ENCOUNTER
----- Message from Chantal Maravilla MD sent at 4/28/2023 10:04 AM EDT -----  Normal mammogram.  Continue yearly screenings.

## 2023-12-06 ENCOUNTER — HOSPITAL ENCOUNTER (OUTPATIENT)
Dept: RESPIRATORY THERAPY | Age: 63
Discharge: HOME | End: 2023-12-06
Payer: COMMERCIAL

## 2023-12-06 DIAGNOSIS — I77.82 ANCA-ASSOCIATED VASCULITIS (HCC): ICD-10-CM

## 2023-12-06 DIAGNOSIS — Z51.81 THERAPEUTIC DRUG MONITORING: ICD-10-CM

## 2023-12-06 PROCEDURE — 94762 N-INVAS EAR/PLS OXIMTRY CONT: CPT

## 2023-12-26 ENCOUNTER — HOSPITAL ENCOUNTER (OUTPATIENT)
Dept: PULMONOLOGY | Age: 63
Discharge: HOME | End: 2023-12-26
Payer: COMMERCIAL

## 2023-12-26 DIAGNOSIS — Z51.81 THERAPEUTIC DRUG MONITORING: ICD-10-CM

## 2023-12-26 DIAGNOSIS — I77.82 ANCA-ASSOCIATED VASCULITIS (HCC): ICD-10-CM

## 2023-12-26 PROCEDURE — 94729 DIFFUSING CAPACITY: CPT

## 2023-12-26 PROCEDURE — 94726 PLETHYSMOGRAPHY LUNG VOLUMES: CPT

## 2023-12-26 PROCEDURE — 94060 EVALUATION OF WHEEZING: CPT

## 2023-12-26 PROCEDURE — 94618 PULMONARY STRESS TESTING: CPT

## 2023-12-26 NOTE — PROCEDURES
6 Minute Walk Test    Gender Female   Height 163.1 cm     Age 59 y.o. Weight 131.6 kg  Race    Blood Pressure 136/79    Medication taken before the test (dose and time): Drug: Pt did not take her normal daily meds before this test. Pt will take when she is done with testing.    sulfamethoxazole-trimethoprim (BACTRIM DS;SEPTRA DS) 800-160 MG per tablet Take 1 tablet by mouth daily    Immune Globulin, Human, (PRIVIGEN IV) Infuse intravenously every 30 days    entecavir (BARACLUDE) 0.5 MG tablet Take 1 tablet by mouth daily    Risedronate Sodium (ACTONEL PO) Take 150 mg by mouth every 30 days    ezetimibe (ZETIA) 10 MG tablet Take 1 tablet by mouth    riTUXimab (RITUXAN) 100 MG/10ML chemo injection Infuse 100 mLs intravenously    rosuvastatin (CRESTOR) 20 MG tablet Take 1 tablet by mouth    diltiazem (CARDIZEM) 30 MG tablet              Rituximab 1000 mg 4 X's year    Patient was instructed and shown how to perform the 6 Minute Walk Test. Patient understood by repeating instructions back to the therapist. Patient was seated at the starting position at least 10 minutes before the test started. Contraindications to test was assessed and patient did not have any contraindications. Baseline assessment was completed. Patient began test and only standard phrases of encouragement was given during testing. At the 6-minute raymundo patient was told to stop and post assessment was completed. Supplemental oxygen during the test: Participant is not on home oxygen therapy. Stopped or paused before 6 minutes? [x] No [] Yes, reason:      Any symptoms at end of exercise: [x] none [] angina [] dizziness                                                             [] hip, leg, or calf pain.       Total distance walked in 6 minutes: 1260 feet     Baseline End of Test Position of Patient   Time 1145 AM 1151 AM    Heart Rate in beats per minute 71  102  Sitting   Stevan Scale for Dyspnea (scale 0-10) 0 2 Sitting   Stevan

## 2024-01-10 ENCOUNTER — TELEPHONE (OUTPATIENT)
Dept: ONCOLOGY | Age: 64
End: 2024-01-10

## 2024-01-10 ENCOUNTER — TELEPHONE (OUTPATIENT)
Dept: FAMILY MEDICINE CLINIC | Age: 64
End: 2024-01-10

## 2024-01-10 DIAGNOSIS — I77.82 ANCA-ASSOCIATED VASCULITIS (HCC): Primary | ICD-10-CM

## 2024-01-10 NOTE — TELEPHONE ENCOUNTER
----- Message from Jessica Jacobs sent at 1/10/2024  9:15 AM EST -----  Subject: Referral Request    Reason for referral request? Pt is calling in regarding a referral for a   pulmonologist. She already sees someone for this, but she would like to be   referred to someone in the [place] area. She would like to discuss this. She   states there should be notes in her chart that someone put in there from   the cancer center. She says that might be helpful to look at. Please   contact pt to discuss  Provider patient wants to be referred to(if known):     Provider Phone Number(if known):    Additional Information for Provider?   ---------------------------------------------------------------------------  --------------  CALL BACK INFO    [phone]; OK to leave message on voicemail  ---------------------------------------------------------------------------  --------------

## 2024-01-10 NOTE — TELEPHONE ENCOUNTER
Dr. Mayorga reviewed pt's information and her Privigen and Rituximab are not being ordered by an oncologist at [facility]. Per patient these orders are managed by a pulmonologist who is on maternity leave so infusions locally would be more convenient for patient. Explained to patient that although we can give these medications in the infusion center here, her care would need to be overseen by a local pulmonologist or rheumatologist, not oncologist. Pt verbalized understanding, appointment 1/22 cancelled.

## 2024-01-10 NOTE — TELEPHONE ENCOUNTER
Ok, who does she want to see?  Would ask Kira about the diagnosis.  Is it the ANCA associated vasculitis?

## 2024-01-10 NOTE — TELEPHONE ENCOUNTER
She is wanting to see physician or NP at [facility]. She is keeping her pulmonologist in [place]-Dr Nerissa Agudelo, she is needing a local pulmonologist to work with Dr Agudelo and oversee the infusions.   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

## 2024-01-17 ENCOUNTER — OFFICE VISIT (OUTPATIENT)
Dept: PULMONOLOGY | Age: 64
End: 2024-01-17
Payer: COMMERCIAL

## 2024-01-17 VITALS
BODY MASS INDEX: 50.02 KG/M2 | SYSTOLIC BLOOD PRESSURE: 126 MMHG | TEMPERATURE: 98 F | HEART RATE: 104 BPM | OXYGEN SATURATION: 94 % | DIASTOLIC BLOOD PRESSURE: 82 MMHG | HEIGHT: 64 IN | WEIGHT: 293 LBS

## 2024-01-17 DIAGNOSIS — M31.30 GRANULOMATOSIS WITH POLYANGIITIS WITH PULMONARY INVOLVEMENT (HCC): Primary | ICD-10-CM

## 2024-01-17 DIAGNOSIS — D80.1 HYPOGAMMAGLOBULINEMIA (HCC): ICD-10-CM

## 2024-01-17 PROCEDURE — 99203 OFFICE O/P NEW LOW 30 MIN: CPT | Performed by: INTERNAL MEDICINE

## 2024-01-17 RX ORDER — ROSUVASTATIN CALCIUM 40 MG/1
TABLET, COATED ORAL
COMMUNITY
Start: 2023-12-12

## 2024-01-17 RX ORDER — SULFAMETHOXAZOLE AND TRIMETHOPRIM 400; 80 MG/1; MG/1
TABLET ORAL
COMMUNITY
Start: 2024-01-15

## 2024-01-17 RX ORDER — RISEDRONATE SODIUM 150 MG/1
TABLET, FILM COATED ORAL
COMMUNITY
Start: 2024-01-08

## 2024-01-17 ASSESSMENT — ENCOUNTER SYMPTOMS
WHEEZING: 0
VOMITING: 0
SHORTNESS OF BREATH: 1
HEMOPTYSIS: 0

## 2024-01-17 NOTE — PROGRESS NOTES
Physical exam   /82 (Site: Right Lower Arm, Position: Sitting, Cuff Size: Large Adult)   Pulse (!) 104   Temp 98 °F (36.7 °C) (Infrared)   Ht 1.626 m (5' 4\")   Wt 134.9 kg (297 lb 6.4 oz)   SpO2 94%   BMI 51.05 kg/m²    Wt Readings from Last 3 Encounters:   01/17/24 134.9 kg (297 lb 6.4 oz)   04/17/23 120.5 kg (265 lb 9.6 oz)   06/03/22 117.7 kg (259 lb 6.4 oz)       Physical Exam  Constitutional:       General: She is not in acute distress.     Appearance: She is not toxic-appearing.   HENT:      Head: Normocephalic and atraumatic.      Nose: Nose normal.      Mouth/Throat:      Mouth: Mucous membranes are dry.   Eyes:      General: No scleral icterus.     Extraocular Movements: Extraocular movements intact.   Cardiovascular:      Rate and Rhythm: Normal rate and regular rhythm.      Pulses: Normal pulses.      Heart sounds: Normal heart sounds.   Pulmonary:      Effort: Pulmonary effort is normal.      Breath sounds: Normal breath sounds.   Musculoskeletal:         General: Normal range of motion.      Cervical back: Normal range of motion and neck supple.   Skin:     General: Skin is warm and dry.   Neurological:      General: No focal deficit present.      Mental Status: She is alert and oriented to person, place, and time.   Psychiatric:         Mood and Affect: Mood normal.         Behavior: Behavior normal.          results   Lung Nodule Screening     [] Qualifies    [x] Does not qualify   [] Declined    [] Completed     [facility] annual screening for lung cancer with low-dose computed tomography (LDCT) in adults   aged 50 to 80 years   20 pack-year smoking history and   currently smoke or have quit within the past 15 years.     Screening should be discontinued once a person has not smoked for 15 years or develops a health problem that substantially limits life expectancy or the ability or willingness to have curative lung surgery.     Assessment      Diagnosis Orders   1.

## 2024-05-03 ENCOUNTER — NURSE ONLY (OUTPATIENT)
Dept: LAB | Age: 64
End: 2024-05-03

## 2024-05-03 ENCOUNTER — HOSPITAL ENCOUNTER (OUTPATIENT)
Dept: MAMMOGRAPHY | Age: 64
Discharge: HOME | End: 2024-05-03
Payer: COMMERCIAL

## 2024-05-03 DIAGNOSIS — Z12.31 VISIT FOR SCREENING MAMMOGRAM: ICD-10-CM

## 2024-05-03 LAB
ALBUMIN SERPL BCG-MCNC: 3.9 G/DL (ref 3.5–5.1)
ALP SERPL-CCNC: 102 U/L (ref 38–126)
ALT SERPL W/O P-5'-P-CCNC: 27 U/L (ref 11–66)
AST SERPL-CCNC: 24 U/L (ref 5–40)
BILIRUB CONJ SERPL-MCNC: < 0.2 MG/DL (ref 0–0.3)
BILIRUB SERPL-MCNC: 0.3 MG/DL (ref 0.3–1.2)
CHOLEST SERPL-MCNC: 169 MG/DL (ref 100–199)
HDLC SERPL-MCNC: 45 MG/DL
LDLC SERPL CALC-MCNC: 99 MG/DL
PROT SERPL-MCNC: 7.1 G/DL (ref 6.1–8)
TRIGL SERPL-MCNC: 126 MG/DL (ref 0–199)

## 2024-05-03 PROCEDURE — 77063 BREAST TOMOSYNTHESIS BI: CPT

## 2024-05-07 ENCOUNTER — HOSPITAL ENCOUNTER (OUTPATIENT)
Dept: WOMENS IMAGING | Age: 64
Discharge: HOME | End: 2024-05-07
Attending: RADIOLOGY
Payer: COMMERCIAL

## 2024-05-07 DIAGNOSIS — N60.01 CYST OF RIGHT BREAST: ICD-10-CM

## 2024-05-07 DIAGNOSIS — Z09 FOLLOW-UP EXAM: Primary | ICD-10-CM

## 2024-05-07 DIAGNOSIS — R92.8 ABNORMAL MAMMOGRAM: ICD-10-CM

## 2024-05-07 PROCEDURE — 76642 ULTRASOUND BREAST LIMITED: CPT

## 2024-11-13 ENCOUNTER — HOSPITAL ENCOUNTER (OUTPATIENT)
Dept: WOMENS IMAGING | Age: 64
Discharge: HOME | End: 2024-11-13
Attending: RADIOLOGY
Payer: COMMERCIAL

## 2024-11-13 DIAGNOSIS — N60.01 CYST OF RIGHT BREAST: ICD-10-CM

## 2024-11-13 DIAGNOSIS — Z09 FOLLOW-UP EXAM: ICD-10-CM

## 2024-11-13 PROCEDURE — 76642 ULTRASOUND BREAST LIMITED: CPT

## 2024-12-05 ENCOUNTER — OFFICE VISIT (OUTPATIENT)
Dept: FAMILY MEDICINE CLINIC | Age: 64
End: 2024-12-05
Payer: COMMERCIAL

## 2024-12-05 VITALS
TEMPERATURE: 97.2 F | BODY MASS INDEX: 48.82 KG/M2 | HEIGHT: 65 IN | HEART RATE: 91 BPM | DIASTOLIC BLOOD PRESSURE: 78 MMHG | OXYGEN SATURATION: 96 % | SYSTOLIC BLOOD PRESSURE: 126 MMHG | WEIGHT: 293 LBS | RESPIRATION RATE: 20 BRPM

## 2024-12-05 DIAGNOSIS — Z00.00 WELL ADULT EXAM: Primary | ICD-10-CM

## 2024-12-05 DIAGNOSIS — M05.9 RHEUMATOID ARTHRITIS WITH POSITIVE RHEUMATOID FACTOR, INVOLVING UNSPECIFIED SITE (HCC): ICD-10-CM

## 2024-12-05 DIAGNOSIS — M31.30: ICD-10-CM

## 2024-12-05 PROBLEM — K21.9 GASTROESOPHAGEAL REFLUX DISEASE: Status: RESOLVED | Noted: 2020-03-04 | Resolved: 2024-12-05

## 2024-12-05 PROCEDURE — 99396 PREV VISIT EST AGE 40-64: CPT | Performed by: FAMILY MEDICINE

## 2024-12-05 SDOH — ECONOMIC STABILITY: FOOD INSECURITY: WITHIN THE PAST 12 MONTHS, YOU WORRIED THAT YOUR FOOD WOULD RUN OUT BEFORE YOU GOT MONEY TO BUY MORE.: NEVER TRUE

## 2024-12-05 SDOH — ECONOMIC STABILITY: FOOD INSECURITY: WITHIN THE PAST 12 MONTHS, THE FOOD YOU BOUGHT JUST DIDN'T LAST AND YOU DIDN'T HAVE MONEY TO GET MORE.: NEVER TRUE

## 2024-12-05 SDOH — ECONOMIC STABILITY: INCOME INSECURITY: HOW HARD IS IT FOR YOU TO PAY FOR THE VERY BASICS LIKE FOOD, HOUSING, MEDICAL CARE, AND HEATING?: NOT HARD AT ALL

## 2024-12-05 ASSESSMENT — ENCOUNTER SYMPTOMS
DIARRHEA: 0
VOMITING: 0
ABDOMINAL PAIN: 0
WHEEZING: 0
CONSTIPATION: 0
BACK PAIN: 0
BLOOD IN STOOL: 0
CHEST TIGHTNESS: 0
NAUSEA: 0
SHORTNESS OF BREATH: 0
SORE THROAT: 0
EYE PAIN: 0
RHINORRHEA: 0
COUGH: 0

## 2024-12-05 ASSESSMENT — PATIENT HEALTH QUESTIONNAIRE - PHQ9
SUM OF ALL RESPONSES TO PHQ9 QUESTIONS 1 & 2: 0
SUM OF ALL RESPONSES TO PHQ9 QUESTIONS 1 & 2: 0
2. FEELING DOWN, DEPRESSED OR HOPELESS: NOT AT ALL
SUM OF ALL RESPONSES TO PHQ QUESTIONS 1-9: 0
SUM OF ALL RESPONSES TO PHQ QUESTIONS 1-9: 0
2. FEELING DOWN, DEPRESSED OR HOPELESS: NOT AT ALL
SUM OF ALL RESPONSES TO PHQ QUESTIONS 1-9: 0
1. LITTLE INTEREST OR PLEASURE IN DOING THINGS: NOT AT ALL
1. LITTLE INTEREST OR PLEASURE IN DOING THINGS: NOT AT ALL
SUM OF ALL RESPONSES TO PHQ QUESTIONS 1-9: 0

## 2024-12-05 NOTE — PROGRESS NOTES
Cervical back: Neck supple.   Lymphadenopathy:      Cervical: No cervical adenopathy.   Skin:     General: Skin is warm and dry.      Findings: No rash.   Neurological:      Mental Status: She is alert and oriented to person, place, and time.      Cranial Nerves: No cranial nerve deficit.      Deep Tendon Reflexes: Reflexes are normal and symmetric.             Latest Ref Rng & Units 5/3/2024     9:17 AM 12/1/2022     8:23 AM 12/1/2022     8:22 AM   LAB PRIMARY CARE   GLU random 70 - 108 mg/dL  92     CHOL 100 - 199 mg/dL 169      TRIG 0 - 199 mg/dL 126      HDL mg/dL 45      LDL CALC mg/dL 99       - 145 meq/L  139     K 3.5 - 5.2 meq/L  4.7     BUN 7 - 22 mg/dL  18     CR 0.4 - 1.2 mg/dL  0.8     GFR >60 ml/min/1.73m2  >60     CA 8.5 - 10.5 mg/dL  10.2     ALT 11 - 66 U/L 27  23     AST 5 - 40 U/L 24  24     HGB 12.0 - 16.0 gm/dl   15.7        Lab Results   Component Value Date/Time    CHOL 169 05/03/2024 09:17 AM    CHOL 216 11/19/2018 09:24 AM    CHOL 146 11/01/2017 08:43 AM    CHOL 245 08/18/2017 10:59 AM    CHOL 191 03/03/2016 09:06 AM    CHOL 220 03/21/2014 09:17 AM    TRIG 126 05/03/2024 09:17 AM    TRIG 126 11/19/2018 09:24 AM    TRIG 123 11/01/2017 08:43 AM    HDL 45 05/03/2024 09:17 AM    HDL 50 11/19/2018 09:24 AM    HDL 43 11/01/2017 08:43 AM    HDL 40 05/09/2012 09:37 AM    GLUF 100 06/17/2019 07:03 AM    GLUCOSE 92 12/01/2022 08:23 AM    GLUCOSE 85 03/03/2016 09:06 AM       The 10-year ASCVD risk score (Terrell MANNING, et al., 2019) is: 4.4%    Values used to calculate the score:      Age: 64 years      Sex: Female      Is Non- : No      Diabetic: No      Tobacco smoker: No      Systolic Blood Pressure: 126 mmHg      Is BP treated: No      HDL Cholesterol: 45 mg/dL      Total Cholesterol: 169 mg/dL    Immunization History   Administered Date(s) Administered    COVID-19, MODERNA BLUE border, Primary or Immunocompromised, (age 12y+), IM, 100 mcg/0.5mL 02/25/2021,

## 2025-04-24 ENCOUNTER — OFFICE VISIT (OUTPATIENT)
Dept: FAMILY MEDICINE CLINIC | Age: 65
End: 2025-04-24
Payer: COMMERCIAL

## 2025-04-24 VITALS
SYSTOLIC BLOOD PRESSURE: 122 MMHG | TEMPERATURE: 97.4 F | DIASTOLIC BLOOD PRESSURE: 72 MMHG | RESPIRATION RATE: 20 BRPM | HEART RATE: 84 BPM | BODY MASS INDEX: 49.74 KG/M2 | OXYGEN SATURATION: 94 % | WEIGHT: 293 LBS

## 2025-04-24 DIAGNOSIS — M79.604 RIGHT LEG PAIN: Primary | ICD-10-CM

## 2025-04-24 DIAGNOSIS — I77.82 ANCA-ASSOCIATED VASCULITIS (HCC): ICD-10-CM

## 2025-04-24 DIAGNOSIS — M05.9 RHEUMATOID ARTHRITIS WITH POSITIVE RHEUMATOID FACTOR, INVOLVING UNSPECIFIED SITE (HCC): ICD-10-CM

## 2025-04-24 LAB
CK SERPL-CCNC: 70 U/L (ref 26–192)
CRP SERPL-MCNC: 1.22 MG/DL (ref 0–0.5)
ERYTHROCYTE [SEDIMENTATION RATE] IN BLOOD BY WESTERGREN METHOD: 25 MM/HR (ref 0–20)

## 2025-04-24 PROCEDURE — 99214 OFFICE O/P EST MOD 30 MIN: CPT | Performed by: FAMILY MEDICINE

## 2025-04-24 RX ORDER — MOMETASONE FUROATE 1 MG/ML
SOLUTION TOPICAL DAILY
COMMUNITY

## 2025-04-24 SDOH — ECONOMIC STABILITY: FOOD INSECURITY: WITHIN THE PAST 12 MONTHS, THE FOOD YOU BOUGHT JUST DIDN'T LAST AND YOU DIDN'T HAVE MONEY TO GET MORE.: NEVER TRUE

## 2025-04-24 SDOH — ECONOMIC STABILITY: FOOD INSECURITY: WITHIN THE PAST 12 MONTHS, YOU WORRIED THAT YOUR FOOD WOULD RUN OUT BEFORE YOU GOT MONEY TO BUY MORE.: NEVER TRUE

## 2025-04-24 ASSESSMENT — PATIENT HEALTH QUESTIONNAIRE - PHQ9
SUM OF ALL RESPONSES TO PHQ QUESTIONS 1-9: 0
1. LITTLE INTEREST OR PLEASURE IN DOING THINGS: NOT AT ALL
SUM OF ALL RESPONSES TO PHQ QUESTIONS 1-9: 0
SUM OF ALL RESPONSES TO PHQ QUESTIONS 1-9: 0
2. FEELING DOWN, DEPRESSED OR HOPELESS: NOT AT ALL
SUM OF ALL RESPONSES TO PHQ QUESTIONS 1-9: 0

## 2025-04-24 ASSESSMENT — ENCOUNTER SYMPTOMS
CONSTIPATION: 0
VOMITING: 0
ABDOMINAL PAIN: 0
EYE PAIN: 0
WHEEZING: 0
CHEST TIGHTNESS: 0
BACK PAIN: 0
DIARRHEA: 0
SHORTNESS OF BREATH: 0
COUGH: 0
SORE THROAT: 0
RHINORRHEA: 0
NAUSEA: 0
BLOOD IN STOOL: 0

## 2025-04-24 NOTE — PROGRESS NOTES
Kira Montilla (:  1960) is a 64 y.o. female,Established patient, here for evaluation of the following chief complaint(s):  Leg Pain (Right leg, x 15 days, no injury)         Assessment & Plan  Right leg pain   New, not at goal (unstable), get blood work to further evaluate    Orders:    CK; Future    C-Reactive Protein; Future    Sedimentation Rate; Future    Rheumatoid arthritis with positive rheumatoid factor, involving unspecified site (HCC)   Monitored by specialist- no acute findings meriting change in the plan    Orders:    CK; Future    C-Reactive Protein; Future    Sedimentation Rate; Future    ANCA-associated vasculitis (HCC)   Monitored by specialist- no acute findings meriting change in the plan    Orders:    CK; Future    C-Reactive Protein; Future    Sedimentation Rate; Future      No follow-ups on file.       Subjective   Leg Pain        Patient here today for a check up.  Reviewed BMI of 49.  Encouraged diet, exercise and weight loss.  2 weeks of right leg pains.  No known injury.  Was on cipro but stopped it 1 month ago.  , non smoker, pmh reviewed.       Review of Systems   Constitutional:  Negative for chills, fatigue, fever and unexpected weight change.   HENT:  Negative for congestion, ear pain, rhinorrhea and sore throat.    Eyes:  Negative for pain and visual disturbance.   Respiratory:  Negative for cough, chest tightness, shortness of breath and wheezing.    Cardiovascular:  Negative for chest pain and palpitations.   Gastrointestinal:  Negative for abdominal pain, blood in stool, constipation, diarrhea, nausea and vomiting.   Genitourinary:  Negative for difficulty urinating, frequency, hematuria and urgency.   Musculoskeletal:  Negative for back pain, joint swelling, myalgias and neck pain.        Right leg pain   Skin:  Negative for rash.   Neurological:  Negative for dizziness and headaches.   Hematological:  Negative for adenopathy. Does not bruise/bleed easily.

## 2025-04-25 ENCOUNTER — RESULTS FOLLOW-UP (OUTPATIENT)
Dept: FAMILY MEDICINE CLINIC | Age: 65
End: 2025-04-25

## 2025-04-25 DIAGNOSIS — M79.604 RIGHT LEG PAIN: Primary | ICD-10-CM

## 2025-04-25 DIAGNOSIS — M05.9 RHEUMATOID ARTHRITIS WITH POSITIVE RHEUMATOID FACTOR, INVOLVING UNSPECIFIED SITE (HCC): ICD-10-CM

## 2025-04-25 DIAGNOSIS — I77.82 ANCA-ASSOCIATED VASCULITIS (HCC): ICD-10-CM

## 2025-04-25 RX ORDER — PREDNISONE 20 MG/1
TABLET ORAL
Qty: 15 TABLET | Refills: 0 | Status: SHIPPED | OUTPATIENT
Start: 2025-04-25

## 2025-05-09 ENCOUNTER — LAB (OUTPATIENT)
Dept: LAB | Age: 65
End: 2025-05-09

## 2025-05-09 DIAGNOSIS — I77.82 ANCA-ASSOCIATED VASCULITIS (HCC): ICD-10-CM

## 2025-05-09 DIAGNOSIS — M05.9 RHEUMATOID ARTHRITIS WITH POSITIVE RHEUMATOID FACTOR, INVOLVING UNSPECIFIED SITE (HCC): ICD-10-CM

## 2025-05-09 LAB
CRP SERPL-MCNC: 2.27 MG/DL (ref 0–0.5)
ERYTHROCYTE [SEDIMENTATION RATE] IN BLOOD BY WESTERGREN METHOD: 26 MM/HR (ref 0–20)

## 2025-05-10 ENCOUNTER — RESULTS FOLLOW-UP (OUTPATIENT)
Dept: FAMILY MEDICINE CLINIC | Age: 65
End: 2025-05-10

## 2025-05-10 DIAGNOSIS — R70.0 ELEVATED SED RATE: ICD-10-CM

## 2025-05-10 DIAGNOSIS — R79.82 ELEVATED C-REACTIVE PROTEIN (CRP): ICD-10-CM

## 2025-05-10 DIAGNOSIS — M79.604 RIGHT LEG PAIN: ICD-10-CM

## 2025-05-12 RX ORDER — PREDNISONE 20 MG/1
TABLET ORAL
Qty: 60 TABLET | Refills: 0 | Status: SHIPPED | OUTPATIENT
Start: 2025-05-12

## 2025-05-27 ENCOUNTER — LAB (OUTPATIENT)
Dept: LAB | Age: 65
End: 2025-05-27

## 2025-05-27 ENCOUNTER — RESULTS FOLLOW-UP (OUTPATIENT)
Dept: FAMILY MEDICINE CLINIC | Age: 65
End: 2025-05-27

## 2025-05-27 DIAGNOSIS — M79.604 RIGHT LEG PAIN: ICD-10-CM

## 2025-05-27 DIAGNOSIS — R70.0 ELEVATED SED RATE: ICD-10-CM

## 2025-05-27 DIAGNOSIS — R79.82 ELEVATED C-REACTIVE PROTEIN (CRP): ICD-10-CM

## 2025-05-27 LAB
CRP SERPL-MCNC: 0.97 MG/DL (ref 0–0.5)
ERYTHROCYTE [SEDIMENTATION RATE] IN BLOOD BY WESTERGREN METHOD: 5 MM/HR (ref 0–20)

## 2025-06-24 ENCOUNTER — LAB (OUTPATIENT)
Dept: LAB | Age: 65
End: 2025-06-24

## 2025-06-24 LAB
ALBUMIN SERPL BCG-MCNC: 3.8 G/DL (ref 3.4–4.9)
ALP SERPL-CCNC: 81 U/L (ref 38–126)
ALT SERPL W/O P-5'-P-CCNC: 38 U/L (ref 10–35)
AST SERPL-CCNC: 34 U/L (ref 10–35)
BILIRUB CONJ SERPL-MCNC: 0.2 MG/DL (ref 0–0.2)
BILIRUB SERPL-MCNC: 0.5 MG/DL (ref 0.3–1.2)
CHOLESTEROL, FASTING: 161 MG/DL (ref 100–199)
HDLC SERPL-MCNC: 35 MG/DL
LDLC SERPL CALC-MCNC: 99 MG/DL
PROT SERPL-MCNC: 6.4 G/DL (ref 6.4–8.3)
TRIGLYCERIDE, FASTING: 136 MG/DL (ref 0–199)

## 2025-07-14 ENCOUNTER — TELEPHONE (OUTPATIENT)
Dept: FAMILY MEDICINE CLINIC | Age: 65
End: 2025-07-14

## 2025-07-14 NOTE — TELEPHONE ENCOUNTER
Care Transitions Initial Follow Up Call    Outreach made within 2 business days of discharge: Yes    Patient: Kira Montilla Patient : 1960   MRN: [id number]  Reason for Admission: DM, new  Discharge Date: 10/5/19       Spoke with: Pt    Discharge department/facility: [facility] Interactive Patient Contact:  Was patient able to fill all prescriptions: Yes  Was patient instructed to bring all medications to the follow-up visit: Yes  Is patient taking all medications as directed in the discharge summary? Yes  Does patient understand their discharge instructions: Yes  Does patient have questions or concerns that need addressed prior to 7-14 day follow up office visit: no    Additional needs identified to be addressed with provider  No needs identified             Scheduled appointment with PCP within 7-14 days    Follow Up  Future Appointments   Date Time Provider Department Center   2025  3:30 PM [facility]   2025  1:00 PM Brandi Michael MD [facility]       Geovanna Fishman CMA ([facility])

## 2025-07-21 ENCOUNTER — OFFICE VISIT (OUTPATIENT)
Dept: FAMILY MEDICINE CLINIC | Age: 65
End: 2025-07-21

## 2025-07-21 VITALS
HEART RATE: 91 BPM | WEIGHT: 293 LBS | RESPIRATION RATE: 18 BRPM | SYSTOLIC BLOOD PRESSURE: 126 MMHG | OXYGEN SATURATION: 96 % | BODY MASS INDEX: 48.77 KG/M2 | TEMPERATURE: 97.6 F | DIASTOLIC BLOOD PRESSURE: 66 MMHG

## 2025-07-21 DIAGNOSIS — Z09 HOSPITAL DISCHARGE FOLLOW-UP: ICD-10-CM

## 2025-07-21 DIAGNOSIS — E11.9 NEW ONSET TYPE 2 DIABETES MELLITUS (HCC): Primary | ICD-10-CM

## 2025-07-21 DIAGNOSIS — E78.00 HYPERCHOLESTEROLEMIA: ICD-10-CM

## 2025-07-21 DIAGNOSIS — I47.29 NONSUSTAINED VENTRICULAR TACHYCARDIA (HCC): ICD-10-CM

## 2025-07-21 RX ORDER — GLIPIZIDE 5 MG/1
5 TABLET ORAL
COMMUNITY
End: 2025-07-21 | Stop reason: SDUPTHER

## 2025-07-21 RX ORDER — METOPROLOL SUCCINATE 25 MG/1
25 TABLET, EXTENDED RELEASE ORAL DAILY
COMMUNITY

## 2025-07-21 RX ORDER — GLIPIZIDE 5 MG/1
5 TABLET ORAL
Qty: 180 TABLET | Refills: 1 | Status: SHIPPED | OUTPATIENT
Start: 2025-07-21

## 2025-07-21 ASSESSMENT — RHEUMATOLOGY NEW PATIENT QUESTIONNAIRE
HEARTBURN OR REFLUX: N
ANXIETY: N
COLOR CHANGES OF HANDS OR FEET IN THE COLD: N
DRYNESS OF MOUTH: Y
LOSS OF CONSCIOUSNESS: N
DIFFICULTY FALLING ASLEEP: N
SWOLLEN OR TENDER GLANDS: N
FAINTING: N
SWOLLEN LEGS OR FEET: Y
NODULES/BUMPS: Y
ANEMIA: N
EASILY LOSING TEMPER: N
JOINT SWELLING: Y
MUSCLE WEAKNESS: Y
MEMORY LOSS: N
UNUSUAL FATIGUE: Y
JOINT PAIN: Y
AGITATION: N
COUGH: N
EXCESSIVE HAIR LOSS (MORE THAN YOUR NORM): N
JAUNDICE: N
FEVER: N
EASY BRUISING: N
VOMITING OF BLOOD OR COFFEE GROUND CONSISTENCY MATERIAL: N
DEPRESSION: N
SKIN REDNESS: N
NIGHT SWEATS: Y
BEHAVIORAL CHANGES: N
SHORTNESS OF BREATH: N
UNEXPLAINED HEARING LOSS: Y
DIFFICULTY BREATHING LYING DOWN: N
PERSISTENT DIARRHEA: N
VAGINAL DRYNESS: N
NAUSEA: N
DOUBLE OR BLURRED VISION: Y
EYE DRYNESS: N
DIFFICULTY STAYING ASLEEP: Y
UNUSUAL BLEEDING: N
DIFFICULTY SWALLOWING: N
RASH OR ULCERS: N
EYE PAIN: N
STOMACH PAIN: N
NUMBNESS OR TINGLING IN HANDS OR FEET: N
SUN SENSITIVE (SUN ALLERGY): N
BLOOD IN STOOLS: N
HOARSE VOICE: N
BLACK STOOLS: N
SEIZURES: N
UNEXPLAINED WEIGHT CHANGE: Y
ABNORMAL URINE: N
RASH: N
SKIN TIGHTNESS: N
MORNING STIFFNESS: N
HEADACHES: N
UNUSUALLY RAPID OR SLOWED HEART RATE: Y
EYE REDNESS: N
PAIN OR BURNING ON URINATION: N
INCREASED SUSCEPTIBILITY TO INFECTION: N
SORES IN MOUTH OR NOSE: N

## 2025-07-21 ASSESSMENT — ENCOUNTER SYMPTOMS
SORE THROAT: 0
DIARRHEA: 0
EYE PAIN: 0
SHORTNESS OF BREATH: 0
RHINORRHEA: 0
BACK PAIN: 0
CHEST TIGHTNESS: 0
VOMITING: 0
COUGH: 0
NAUSEA: 0
WHEEZING: 0
CONSTIPATION: 0
ABDOMINAL PAIN: 0
BLOOD IN STOOL: 0

## 2025-07-21 NOTE — PATIENT INSTRUCTIONS
Please notice!  [facility] is requesting that you please bring your current medications in their bottles, including any        over-the-counter (OTC) medicines with you to your appointment.  This will ensure your medications are properly updated within your chart and correctly sent to the pharmacy.  Thank you,  [facility] Staff

## 2025-07-21 NOTE — PROGRESS NOTES
Post-Discharge Transitional Care Follow Up    Kira Montilla   YOB: 1960    Date of Office Visit:  7/21/2025  Date of Hospital Admission: 07/11/2025  Date of Hospital Discharge: 07/13/2025   [facility]    Care management risk score Rising risk (score 2-5) and Complex Care (Scores >=6): No Risk Score On File     Non face to face  following discharge, date last encounter closed (first attempt may have been earlier): 07/14/2025     Call initiated 2 business days of discharge: Yes    ASSESSMENT/PLAN:   New onset type 2 diabetes mellitus (HCC)  -     glipiZIDE (GLUCOTROL) 5 MG tablet; Take 1 tablet by mouth 2 times daily (before meals), Disp-180 tablet, R-1Normal  -     metFORMIN (GLUCOPHAGE) 850 MG tablet; Take 1 tablet by mouth 2 times daily (with meals), Disp-180 tablet, R-1Normal  -     Hemoglobin A1C; Future  -unstable, recent hospitalization, meds started, serial a1cs, declines diabetic education  Hypercholesterolemia  -stable, controlled on zetia and crestor  Nonsustained ventricular tachycardia (HCC)  -stable, controlled on metoprolol and diltiazem  Hospital discharge follow-up  -     RI DISCHARGE MEDS RECONCILED W/ CURRENT OUTPATIENT MED LIST      Medical Decision Making: moderate complexity  Return if symptoms worsen or fail to improve.           Subjective:   HPI  presented to [facility] due to abnormal blood work    Inpatient course: Discharge summary reviewed- see chart.    Interval history/Current status: Glucose of 493, new onset DM 2, started on metformin and glipizide.  Reviewed home reading ( 103-218).  , nonsmoker, pmh reviewed.  Reviewed BMI of 49.  Encouraged diet, exercise and weight loss.    Patient Active Problem List   Diagnosis    Hypercholesterolemia    Pure hypercholesterolemia    Morbid obesity (HCC)    Hypothyroidism    Morbid obesity with BMI of 45.0-49.9, adult (HCC)    Allergic rhinitis    Vitamin D deficiency    Hyperinsulinemia    Parotid mass    Elevated rheumatoid factor

## 2025-07-24 ENCOUNTER — OFFICE VISIT (OUTPATIENT)
Age: 65
End: 2025-07-24
Payer: COMMERCIAL

## 2025-07-24 ENCOUNTER — HOSPITAL ENCOUNTER (OUTPATIENT)
Age: 65
Discharge: HOME | End: 2025-07-24
Payer: COMMERCIAL

## 2025-07-24 ENCOUNTER — HOSPITAL ENCOUNTER (OUTPATIENT)
Dept: GENERAL RADIOLOGY | Age: 65
Discharge: HOME | End: 2025-07-24
Payer: COMMERCIAL

## 2025-07-24 VITALS
OXYGEN SATURATION: 92 % | HEART RATE: 76 BPM | DIASTOLIC BLOOD PRESSURE: 80 MMHG | SYSTOLIC BLOOD PRESSURE: 122 MMHG | HEIGHT: 65 IN | WEIGHT: 293 LBS | BODY MASS INDEX: 48.82 KG/M2

## 2025-07-24 DIAGNOSIS — M25.561 CHRONIC PAIN OF BOTH KNEES: ICD-10-CM

## 2025-07-24 DIAGNOSIS — M25.561 CHRONIC PAIN OF BOTH KNEES: Primary | ICD-10-CM

## 2025-07-24 DIAGNOSIS — M25.562 CHRONIC PAIN OF BOTH KNEES: ICD-10-CM

## 2025-07-24 DIAGNOSIS — G89.29 CHRONIC PAIN OF BOTH KNEES: ICD-10-CM

## 2025-07-24 DIAGNOSIS — M31.30 GRANULOMATOSIS WITH POLYANGIITIS WITHOUT RENAL INVOLVEMENT (HCC): ICD-10-CM

## 2025-07-24 DIAGNOSIS — M81.0 OSTEOPOROSIS WITHOUT CURRENT PATHOLOGICAL FRACTURE, UNSPECIFIED OSTEOPOROSIS TYPE: ICD-10-CM

## 2025-07-24 DIAGNOSIS — G89.29 CHRONIC PAIN OF BOTH KNEES: Primary | ICD-10-CM

## 2025-07-24 DIAGNOSIS — M25.562 CHRONIC PAIN OF BOTH KNEES: Primary | ICD-10-CM

## 2025-07-24 LAB
25(OH)D3 SERPL-MCNC: 19 NG/ML (ref 30–100)
RHEUMATOID FACT SERPL-ACNC: < 10 IU/ML (ref 0–13)

## 2025-07-24 PROCEDURE — 86200 CCP ANTIBODY: CPT

## 2025-07-24 PROCEDURE — 86430 RHEUMATOID FACTOR TEST QUAL: CPT

## 2025-07-24 PROCEDURE — 99204 OFFICE O/P NEW MOD 45 MIN: CPT | Performed by: INTERNAL MEDICINE

## 2025-07-24 PROCEDURE — 73562 X-RAY EXAM OF KNEE 3: CPT

## 2025-07-24 PROCEDURE — 36415 COLL VENOUS BLD VENIPUNCTURE: CPT

## 2025-07-24 PROCEDURE — 82306 VITAMIN D 25 HYDROXY: CPT

## 2025-07-24 RX ORDER — DIPHENHYDRAMINE HCL 25 MG
25 CAPSULE ORAL PRN
COMMUNITY
Start: 2025-07-07

## 2025-07-24 RX ORDER — ACETAMINOPHEN 325 MG/1
650 TABLET ORAL PRN
COMMUNITY
Start: 2025-07-07

## 2025-07-24 ASSESSMENT — ENCOUNTER SYMPTOMS
VOMITING: 0
NAUSEA: 0
SHORTNESS OF BREATH: 0
WHEEZING: 0
BLOOD IN STOOL: 0
COUGH: 0
ABDOMINAL PAIN: 0

## 2025-07-24 NOTE — PROGRESS NOTES
[facility]   Rheumatology Clinic Note      7/24/2025       Reason for Consult:  rheumatoid arthritis  Requesting Physician:  Berry Shirley MD     CHIEF COMPLAINT:    Chief Complaint   Patient presents with    New Patient     Orbital granulomatosis with polyangiitis (GPA) (HCC), Rheumatoid arthritis with positive rheumatoid factor, involving unspecified site      Other     Previous patient of Dr. Schroeder, last visit 6 months ago. She states that she was diagnosed with RA in 2016.     Joint Pain     Bilateral knee and toe pain.  Pain level 5            HISTORY OF PRESENT ILLNESS:    65 y.o. female presents today to [facility].   Reports that she was following with Dr. Schroeder for rheumatoid arthritis. She is also being prescribed Actonel by her former rheumatologist. Has been on Actonel for around 3 years.    States that she was diagnosed with GPA in 2016. Initially managed with methotrexate and went into remission. Had relapse in GPA in 2019, when she started following Dr. Agudelo (pulmonology at [facility]) who is managing GPA. Clinical manifestations at time of diagnosis include: fontal sinus involvement, parotid gland enlargement, nasal bridge and hearing loss. Presently, is on Rituximab infusion every 4 months.  Past meds: methotrexate and steroids.    Pain is in her knees and toes. This has been on going for 6 months.   Has swelling in her legs. No joint swelling.   Has prolonged morning stiffness that lasts for 30 minutes.  Prolonged sitting aggravates the pain. Walking aggravates the pain.     H/o torn meniscus before 2016.    Reports that she was diagnosed 10 days ago with diabetes. Fatigue improved after starting glipizide and metformin.       Past Medical History:     has a past medical history of Acne rosacea, Allergic rhinitis, DM (diabetes mellitus), type 2 (HCC), Hyperlipidemia, Hypothyroidism, and Wegener's granulomatosis.    Past Surgical History:     has a past surgical history that

## 2025-07-25 ENCOUNTER — RESULTS FOLLOW-UP (OUTPATIENT)
Age: 65
End: 2025-07-25

## 2025-07-27 LAB — CYCLIC CITRULLINATED PEPTIDE ANTIBODY IGG: 1.5 U/ML (ref 0–7)

## 2025-07-28 DIAGNOSIS — E11.9 NEW ONSET TYPE 2 DIABETES MELLITUS (HCC): Primary | ICD-10-CM

## 2025-07-28 RX ORDER — HYDROCHLOROTHIAZIDE 12.5 MG/1
CAPSULE ORAL
Qty: 6 EACH | Refills: 3 | Status: SHIPPED | OUTPATIENT
Start: 2025-07-28

## 2025-07-28 NOTE — TELEPHONE ENCOUNTER
Pt called, states she contacted her insurance company and they will cover the CG Keila 3+.  Please send prescription to  [facility].

## 2025-07-29 ENCOUNTER — TELEPHONE (OUTPATIENT)
Dept: FAMILY MEDICINE CLINIC | Age: 65
End: 2025-07-29

## 2025-08-01 ENCOUNTER — TELEPHONE (OUTPATIENT)
Dept: FAMILY MEDICINE CLINIC | Age: 65
End: 2025-08-01

## 2025-08-01 DIAGNOSIS — E11.9 NEW ONSET TYPE 2 DIABETES MELLITUS (HCC): Primary | ICD-10-CM

## 2025-08-01 NOTE — TELEPHONE ENCOUNTER
Pt recently diagnosed with Diabetes. Having troubles with her foods, recipes, etc to help with her diabetes & some education. Pt wanting Referral to see a Nutritionist.    Order pending.

## 2025-08-05 ENCOUNTER — TELEPHONE (OUTPATIENT)
Dept: FAMILY MEDICINE CLINIC | Age: 65
End: 2025-08-05

## 2025-08-13 ENCOUNTER — TELEPHONE (OUTPATIENT)
Age: 65
End: 2025-08-13

## 2025-08-13 ENCOUNTER — OFFICE VISIT (OUTPATIENT)
Age: 65
End: 2025-08-13
Payer: COMMERCIAL

## 2025-08-13 VITALS
BODY MASS INDEX: 48.82 KG/M2 | HEIGHT: 65 IN | SYSTOLIC BLOOD PRESSURE: 116 MMHG | HEART RATE: 86 BPM | OXYGEN SATURATION: 92 % | WEIGHT: 293 LBS | DIASTOLIC BLOOD PRESSURE: 60 MMHG

## 2025-08-13 DIAGNOSIS — M25.562 CHRONIC PAIN OF BOTH KNEES: ICD-10-CM

## 2025-08-13 DIAGNOSIS — M81.0 AGE-RELATED OSTEOPOROSIS WITHOUT CURRENT PATHOLOGICAL FRACTURE: ICD-10-CM

## 2025-08-13 DIAGNOSIS — G89.29 CHRONIC PAIN OF BOTH KNEES: ICD-10-CM

## 2025-08-13 DIAGNOSIS — M25.561 CHRONIC PAIN OF BOTH KNEES: ICD-10-CM

## 2025-08-13 DIAGNOSIS — M17.0 PRIMARY OSTEOARTHRITIS OF BOTH KNEES: Primary | ICD-10-CM

## 2025-08-13 PROCEDURE — 99214 OFFICE O/P EST MOD 30 MIN: CPT | Performed by: INTERNAL MEDICINE

## 2025-08-13 RX ORDER — RISEDRONATE SODIUM 150 MG/1
150 TABLET, FILM COATED ORAL
Qty: 90 TABLET | Refills: 1 | Status: SHIPPED | OUTPATIENT
Start: 2025-08-13 | End: 2026-02-09

## 2025-08-13 ASSESSMENT — ENCOUNTER SYMPTOMS
SHORTNESS OF BREATH: 0
NAUSEA: 0
VOMITING: 0
COUGH: 0
ABDOMINAL PAIN: 0

## 2025-08-13 ASSESSMENT — LIFESTYLE VARIABLES
HOW OFTEN DO YOU HAVE A DRINK CONTAINING ALCOHOL: NEVER
HOW MANY STANDARD DRINKS CONTAINING ALCOHOL DO YOU HAVE ON A TYPICAL DAY: PATIENT DOES NOT DRINK

## 2025-08-18 ENCOUNTER — TELEPHONE (OUTPATIENT)
Dept: FAMILY MEDICINE CLINIC | Age: 65
End: 2025-08-18

## 2025-08-18 DIAGNOSIS — E11.9 NEW ONSET TYPE 2 DIABETES MELLITUS (HCC): Primary | ICD-10-CM

## 2025-08-19 RX ORDER — ACYCLOVIR 400 MG/1
TABLET ORAL
Qty: 3 EACH | Refills: 11 | Status: SHIPPED | OUTPATIENT
Start: 2025-08-19 | End: 2025-08-21 | Stop reason: CLARIF

## 2025-08-19 RX ORDER — ACYCLOVIR 400 MG/1
TABLET ORAL
Qty: 1 EACH | Refills: 0 | Status: SHIPPED | OUTPATIENT
Start: 2025-08-19 | End: 2025-08-21 | Stop reason: CLARIF

## 2025-08-21 DIAGNOSIS — E11.9 NEW ONSET TYPE 2 DIABETES MELLITUS (HCC): ICD-10-CM

## 2025-08-21 RX ORDER — HYDROCHLOROTHIAZIDE 12.5 MG/1
CAPSULE ORAL
Qty: 6 EACH | Refills: 3 | Status: CANCELLED | OUTPATIENT
Start: 2025-08-21

## 2025-09-04 ENCOUNTER — HOSPITAL ENCOUNTER (OUTPATIENT)
Dept: PHYSICAL THERAPY | Age: 65
Setting detail: THERAPIES SERIES
Discharge: HOME | End: 2025-09-04
Payer: COMMERCIAL

## 2025-09-04 PROCEDURE — 97161 PT EVAL LOW COMPLEX 20 MIN: CPT

## 2025-09-04 PROCEDURE — 97110 THERAPEUTIC EXERCISES: CPT
